# Patient Record
Sex: FEMALE | Race: WHITE | NOT HISPANIC OR LATINO | Employment: FULL TIME | URBAN - METROPOLITAN AREA
[De-identification: names, ages, dates, MRNs, and addresses within clinical notes are randomized per-mention and may not be internally consistent; named-entity substitution may affect disease eponyms.]

---

## 2021-03-23 ENCOUNTER — HOSPITAL ENCOUNTER (OUTPATIENT)
Dept: PREADMISSION TESTING | Facility: HOSPITAL | Age: 26
Discharge: HOME | End: 2021-03-23
Attending: OBSTETRICS & GYNECOLOGY

## 2021-03-24 LAB — SARS-COV-2 RNA SPEC QL NAA+PROBE: NOT DETECTED

## 2022-01-13 ENCOUNTER — OFFICE VISIT (OUTPATIENT)
Dept: OBSTETRICS AND GYNECOLOGY | Facility: CLINIC | Age: 27
End: 2022-01-13

## 2022-01-13 VITALS
WEIGHT: 196 LBS | HEART RATE: 97 BPM | BODY MASS INDEX: 31.5 KG/M2 | SYSTOLIC BLOOD PRESSURE: 143 MMHG | HEIGHT: 66 IN | DIASTOLIC BLOOD PRESSURE: 84 MMHG

## 2022-01-13 DIAGNOSIS — Z01.419 WELL WOMAN EXAM WITH ROUTINE GYNECOLOGICAL EXAM: Primary | ICD-10-CM

## 2022-01-13 PROCEDURE — G0123 SCREEN CERV/VAG THIN LAYER: HCPCS | Performed by: OBSTETRICS & GYNECOLOGY

## 2022-01-13 PROCEDURE — 99395 PREV VISIT EST AGE 18-39: CPT | Performed by: OBSTETRICS & GYNECOLOGY

## 2022-01-13 NOTE — PROGRESS NOTES
"Well Woman Visit    CC: WWE     Myriad intake: No    Tobacco/Nicotine use:  No    HPI:   26 y.o. Contraception or HRT: Condoms    History of , 8 pounds 5 ounces    History: PMHx, Meds, Allergies, PSHx, Social Hx, and POBHx all reviewed and updated.  PCP: does manage PMHx and preventative labs      /84   Pulse 97   Ht 167.6 cm (66\")   Wt 88.9 kg (196 lb)   LMP 2022   BMI 31.64 kg/m²     Physical Exam Physical Exam  Vitals and nursing note reviewed. Exam conducted with a chaperone present.   Constitutional:       Appearance: Normal appearance.   Neck:      Thyroid: No thyroid mass or thyromegaly.   Cardiovascular:      Rate and Rhythm: Regular rhythm.   Pulmonary:      Effort: Pulmonary effort is normal.      Unlabored  Chest:      Breasts: soft   normal glandularity        Right: No mass, nipple discharge or tenderness.         Left: No mass, nipple discharge or tenderness.   Abdominal:      General: There is no distension.      Palpations: Abdomen is soft.      Tenderness: There is no guarding or rebound.   Genitourinary:     General: Normal vulva.      Labia:   No lesions     Urethra: No urethral pain or urethral lesion.      Vagina: Normal. No vaginal discharge, tenderness or prolapsed vaginal walls.      Cervix: Normal.      Uterus: Normal.  Anteverted     Adnexa: Right adnexa normal and left adnexa normal.   Musculoskeletal:      Cervical back: Neck supple. No tenderness.   Skin:     General: Skin is warm and dry.   Neurological:      Mental Status: She is alert and oriented to person, place, and time.   Psychiatric:         Mood and Affect: Mood normal.         Behavior: Behavior normal.         Thought Content: Thought content normal.       ASSESSMENT AND PLAN:  WWE    Diagnoses and all orders for this visit:    1. Well woman exam with routine gynecological exam (Primary)        Counseling:     Track menses, RTO IF <q21d, >7d long, or heavy    Preventative:   MMG after age 40  s/p " COVID vaccine    Follow Up:  No follow-ups on file.    Ino White Sr., MD  01/13/2022

## 2022-01-18 LAB
CONV .: NORMAL
CYTOLOGIST CVX/VAG CYTO: NORMAL
CYTOLOGY CVX/VAG DOC CYTO: NORMAL
CYTOLOGY CVX/VAG DOC THIN PREP: NORMAL
DX ICD CODE: NORMAL
HIV 1 & 2 AB SER-IMP: NORMAL
OTHER STN SPEC: NORMAL
STAT OF ADQ CVX/VAG CYTO-IMP: NORMAL

## 2022-02-24 ENCOUNTER — TELEPHONE (OUTPATIENT)
Dept: OBSTETRICS AND GYNECOLOGY | Facility: CLINIC | Age: 27
End: 2022-02-24

## 2023-06-01 ENCOUNTER — OFFICE VISIT (OUTPATIENT)
Dept: INTERNAL MEDICINE | Facility: CLINIC | Age: 28
End: 2023-06-01
Payer: COMMERCIAL

## 2023-06-01 VITALS
OXYGEN SATURATION: 98 % | WEIGHT: 218.4 LBS | HEART RATE: 89 BPM | HEIGHT: 66 IN | BODY MASS INDEX: 35.1 KG/M2 | TEMPERATURE: 97.6 F | DIASTOLIC BLOOD PRESSURE: 89 MMHG | SYSTOLIC BLOOD PRESSURE: 141 MMHG

## 2023-06-01 DIAGNOSIS — R53.83 OTHER FATIGUE: ICD-10-CM

## 2023-06-01 DIAGNOSIS — F33.0 MILD EPISODE OF RECURRENT MAJOR DEPRESSIVE DISORDER: ICD-10-CM

## 2023-06-01 DIAGNOSIS — Z11.59 NEED FOR HEPATITIS C SCREENING TEST: ICD-10-CM

## 2023-06-01 DIAGNOSIS — Z00.00 ENCOUNTER FOR MEDICAL EXAMINATION TO ESTABLISH CARE: ICD-10-CM

## 2023-06-01 DIAGNOSIS — Z00.00 ANNUAL PHYSICAL EXAM: Primary | ICD-10-CM

## 2023-06-01 DIAGNOSIS — Z13.29 SCREENING FOR THYROID DISORDER: ICD-10-CM

## 2023-06-01 DIAGNOSIS — Z13.220 SCREENING FOR LIPID DISORDERS: ICD-10-CM

## 2023-06-01 DIAGNOSIS — F41.1 GAD (GENERALIZED ANXIETY DISORDER): ICD-10-CM

## 2023-06-01 LAB
ALBUMIN SERPL-MCNC: 4.8 G/DL (ref 3.5–5.2)
ALBUMIN/GLOB SERPL: 1.5 G/DL
ALP SERPL-CCNC: 69 U/L (ref 39–117)
ALT SERPL W P-5'-P-CCNC: 23 U/L (ref 1–33)
ANION GAP SERPL CALCULATED.3IONS-SCNC: 12 MMOL/L (ref 5–15)
AST SERPL-CCNC: 26 U/L (ref 1–32)
BASOPHILS # BLD AUTO: 0.02 10*3/MM3 (ref 0–0.2)
BASOPHILS NFR BLD AUTO: 0.3 % (ref 0–1.5)
BILIRUB SERPL-MCNC: 0.5 MG/DL (ref 0–1.2)
BUN SERPL-MCNC: 11 MG/DL (ref 6–20)
BUN/CREAT SERPL: 15.3 (ref 7–25)
CALCIUM SPEC-SCNC: 9.9 MG/DL (ref 8.6–10.5)
CHLORIDE SERPL-SCNC: 100 MMOL/L (ref 98–107)
CHOLEST SERPL-MCNC: 209 MG/DL (ref 0–200)
CO2 SERPL-SCNC: 25 MMOL/L (ref 22–29)
CREAT SERPL-MCNC: 0.72 MG/DL (ref 0.57–1)
DEPRECATED RDW RBC AUTO: 43.7 FL (ref 37–54)
EGFRCR SERPLBLD CKD-EPI 2021: 117 ML/MIN/1.73
EOSINOPHIL # BLD AUTO: 0.06 10*3/MM3 (ref 0–0.4)
EOSINOPHIL NFR BLD AUTO: 1 % (ref 0.3–6.2)
ERYTHROCYTE [DISTWIDTH] IN BLOOD BY AUTOMATED COUNT: 13 % (ref 12.3–15.4)
GLOBULIN UR ELPH-MCNC: 3.3 GM/DL
GLUCOSE SERPL-MCNC: 93 MG/DL (ref 65–99)
HCT VFR BLD AUTO: 44.3 % (ref 34–46.6)
HDLC SERPL-MCNC: 43 MG/DL (ref 40–60)
HGB BLD-MCNC: 15.1 G/DL (ref 12–15.9)
IMM GRANULOCYTES # BLD AUTO: 0.02 10*3/MM3 (ref 0–0.05)
IMM GRANULOCYTES NFR BLD AUTO: 0.3 % (ref 0–0.5)
IRON 24H UR-MRATE: 96 MCG/DL (ref 37–145)
LDLC SERPL CALC-MCNC: 122 MG/DL (ref 0–100)
LDLC/HDLC SERPL: 2.69 {RATIO}
LYMPHOCYTES # BLD AUTO: 1.65 10*3/MM3 (ref 0.7–3.1)
LYMPHOCYTES NFR BLD AUTO: 28.1 % (ref 19.6–45.3)
MCH RBC QN AUTO: 31.4 PG (ref 26.6–33)
MCHC RBC AUTO-ENTMCNC: 34.1 G/DL (ref 31.5–35.7)
MCV RBC AUTO: 92.1 FL (ref 79–97)
MONOCYTES # BLD AUTO: 0.43 10*3/MM3 (ref 0.1–0.9)
MONOCYTES NFR BLD AUTO: 7.3 % (ref 5–12)
NEUTROPHILS NFR BLD AUTO: 3.69 10*3/MM3 (ref 1.7–7)
NEUTROPHILS NFR BLD AUTO: 63 % (ref 42.7–76)
NRBC BLD AUTO-RTO: 0 /100 WBC (ref 0–0.2)
PLATELET # BLD AUTO: 242 10*3/MM3 (ref 140–450)
PMV BLD AUTO: 12.1 FL (ref 6–12)
POTASSIUM SERPL-SCNC: 3.9 MMOL/L (ref 3.5–5.2)
PROT SERPL-MCNC: 8.1 G/DL (ref 6–8.5)
RBC # BLD AUTO: 4.81 10*6/MM3 (ref 3.77–5.28)
SODIUM SERPL-SCNC: 137 MMOL/L (ref 136–145)
TRIGL SERPL-MCNC: 252 MG/DL (ref 0–150)
TSH SERPL DL<=0.05 MIU/L-ACNC: 1.15 UIU/ML (ref 0.27–4.2)
VLDLC SERPL-MCNC: 44 MG/DL (ref 5–40)
WBC NRBC COR # BLD: 5.87 10*3/MM3 (ref 3.4–10.8)

## 2023-06-01 PROCEDURE — 86803 HEPATITIS C AB TEST: CPT | Performed by: NURSE PRACTITIONER

## 2023-06-01 PROCEDURE — 80061 LIPID PANEL: CPT | Performed by: NURSE PRACTITIONER

## 2023-06-01 PROCEDURE — 83540 ASSAY OF IRON: CPT | Performed by: NURSE PRACTITIONER

## 2023-06-01 PROCEDURE — 80050 GENERAL HEALTH PANEL: CPT | Performed by: NURSE PRACTITIONER

## 2023-06-01 PROCEDURE — 82652 VIT D 1 25-DIHYDROXY: CPT | Performed by: NURSE PRACTITIONER

## 2023-06-01 PROCEDURE — 82607 VITAMIN B-12: CPT | Performed by: NURSE PRACTITIONER

## 2023-06-01 NOTE — PROGRESS NOTES
Chief Complaint  Establish Care (Patient states father has hx of stroke, wants to have labs checked.)    Subjective          Jaimie Dean presents to [facility]  Anxiety  Presents for initial visit. Symptoms include decreased concentration, depressed mood, excessive worry, irritability and nervous/anxious behavior. Patient reports no chest pain, compulsions, confusion, dizziness, dry mouth, feeling of choking, hyperventilation, impotence, insomnia, malaise, muscle tension, nausea, obsessions, palpitations, panic, restlessness, shortness of breath or suicidal ideas.     Her past medical history is significant for depression.   Depression  Visit Type: initial  Patient presents with the following symptoms: decreased concentration, depressed mood, excessive worry, irritability and nervousness/anxiety.  Patient is not experiencing: anhedonia, chest pain, choking sensation, compulsions, confusion, dizziness, dry mouth, fatigue, feelings of hopelessness, feelings of worthlessness, hypersomnia, hyperventilation, impotence, insomnia, malaise, memory impairment, muscle tension, nausea, obsessions, palpitations, panic, psychomotor agitation, psychomotor retardation, restlessness, shortness of breath, suicidal ideas, suicidal planning, thoughts of death, weight gain and weight loss.        Previous PCP: Torrie GUNTER?   Specialist(s): none  COVID vaccine: yes; 2 initial doses; 2021  Pneumonia vaccine: no  Pap Smear: Feb 2022      Father had several strokes.   Trying to conceive     hasnt felt like the old her since she had her son   Tired all the time     PHQ-2 Depression Screening  Little interest or pleasure in doing things? 1-->several days   Feeling down, depressed, or hopeless? 1-->several days   PHQ-2 Total Score 13       Current Outpatient Medications   Medication Instructions    sertraline (Zoloft) 50 MG tablet Take 1/2 tab daily x 5 days then increase to 1 tab  "daily       The following portions of the patient's history were reviewed and updated as appropriate: allergies, current medications, past family history, past medical history, past social history, past surgical history, and problem list.    Objective   Vital Signs:   /89 (BP Location: Right arm, Patient Position: Sitting)   Pulse 89   Temp 97.6 °F (36.4 °C) (Temporal)   Ht 167.6 cm (66\")   Wt 99.1 kg (218 lb 6.4 oz)   SpO2 98%   BMI 35.25 kg/m²     Wt Readings from Last 3 Encounters:   06/01/23 99.1 kg (218 lb 6.4 oz)   01/13/22 88.9 kg (196 lb)     BP Readings from Last 3 Encounters:   06/01/23 141/89   01/13/22 143/84     Physical Exam  Constitutional:       Appearance: She is obese.      Appearance: No acute distress, well-nourished  Head: normocephalic, atraumatic  Eyes: extraocular movements intact, no scleral icterus, no conjunctival injection  Ears, Nose, and Throat: external ears normal, nares patent, moist mucous membranes  Cardiovascular: regular rate and rhythm. no murmurs, rubs, or gallops. no edema  Respiratory: breathing comfortably, symmetric chest rise, clear to auscultation bilaterally. No wheezes, rales, or rhonchi.  Neuro: alert and oriented to time, place, and person. Normal gait  Psych: normal mood and affect     Result Review :   The following data was reviewed by: MICHAEL Church on 06/01/2023:  Common labs          6/1/2023    15:14   Common Labs   Glucose 93    BUN 11    Creatinine 0.72    Sodium 137    Potassium 3.9    Chloride 100    Calcium 9.9    Albumin 4.8    Total Bilirubin 0.5    Alkaline Phosphatase 69    AST (SGOT) 26    ALT (SGPT) 23    WBC 5.87    Hemoglobin 15.1    Hematocrit 44.3    Platelets 242    Total Cholesterol 209    Triglycerides 252    HDL Cholesterol 43    LDL Cholesterol  122             Lab Results   Component Value Date    COVID19 NOT DETECTED 03/23/2021       Procedures        Assessment and Plan    Diagnoses and all orders for this " visit:    1. Annual physical exam (Primary)  -     CBC & Differential  -     Comprehensive Metabolic Panel    2. Screening for thyroid disorder  -     TSH Rfx On Abnormal To Free T4    3. Screening for lipid disorders  -     Lipid Panel    4. Need for hepatitis C screening test  -     HCV Antibody Rfx To Qnt PCR  -     Interpretation:    5. Encounter for medical examination to establish care    6. Other fatigue  -     Iron  -     Vitamin B12  -     Vitamin D 1,25 dihydroxy  -     EBV Antibody Profile    7. DIANA (generalized anxiety disorder)  Assessment & Plan:  Psychological condition is newly identified.  Regular aerobic exercise.  Medication changes per orders.  Psychological condition  will be reassessed in 4 weeks.    Orders:  -     sertraline (Zoloft) 50 MG tablet; Take 1/2 tab daily x 5 days then increase to 1 tab daily  Dispense: 30 tablet; Refill: 0    8. Mild episode of recurrent major depressive disorder  Assessment & Plan:  Patient's depression is single episode and is mild without psychosis. Their depression is currently active and the condition is newly identified. This will be reassessed in 4 weeks. F/U as described:patient was prescribed an antidepressant medicine.    Orders:  -     sertraline (Zoloft) 50 MG tablet; Take 1/2 tab daily x 5 days then increase to 1 tab daily  Dispense: 30 tablet; Refill: 0      Advised on diet, physical activity, sunscreen, helmet, texting and driving, etc      There are no discontinued medications.       Follow Up   No follow-ups on file.  Patient was given instructions and counseling regarding her condition or for health maintenance advice. Please see specific information pulled into the AVS if appropriate.       Soniya Ludwig, MICHAEL  06/06/23  12:49 EDT

## 2023-06-02 LAB — VIT B12 BLD-MCNC: 516 PG/ML (ref 211–946)

## 2023-06-03 LAB
HCV AB SERPL QL IA: NORMAL
HCV IGG SERPL QL IA: NON REACTIVE

## 2023-06-05 LAB
1,25(OH)2D SERPL-MCNC: 72.5 PG/ML (ref 24.8–81.5)
EBV NA IGG SER IA-ACNC: 303 U/ML (ref 0–17.9)
EBV VCA IGG SER IA-ACNC: >600 U/ML (ref 0–17.9)
EBV VCA IGM SER IA-ACNC: <36 U/ML (ref 0–35.9)
SERVICE CMNT-IMP: ABNORMAL

## 2023-06-06 PROBLEM — F33.0 MILD EPISODE OF RECURRENT MAJOR DEPRESSIVE DISORDER: Status: ACTIVE | Noted: 2023-06-06

## 2023-06-06 PROBLEM — F41.1 GAD (GENERALIZED ANXIETY DISORDER): Status: ACTIVE | Noted: 2023-06-06

## 2023-06-06 NOTE — ASSESSMENT & PLAN NOTE
Patient's depression is single episode and is mild without psychosis. Their depression is currently active and the condition is newly identified. This will be reassessed in 4 weeks. F/U as described:patient was prescribed an antidepressant medicine.   DISPLAY PLAN FREE TEXT

## 2023-07-05 PROBLEM — E66.01 CLASS 2 SEVERE OBESITY WITH SERIOUS COMORBIDITY AND BODY MASS INDEX (BMI) OF 35.0 TO 35.9 IN ADULT: Status: ACTIVE | Noted: 2023-07-05

## 2023-08-03 DIAGNOSIS — O20.0 THREATENED ABORTION: Primary | ICD-10-CM

## 2023-08-04 ENCOUNTER — LAB (OUTPATIENT)
Dept: OBSTETRICS AND GYNECOLOGY | Facility: CLINIC | Age: 28
End: 2023-08-04
Payer: COMMERCIAL

## 2023-08-04 DIAGNOSIS — O20.0 THREATENED ABORTION: ICD-10-CM

## 2023-08-04 LAB — HCG INTACT+B SERPL-ACNC: NORMAL MIU/ML

## 2023-08-04 PROCEDURE — 84702 CHORIONIC GONADOTROPIN TEST: CPT | Performed by: OBSTETRICS & GYNECOLOGY

## 2023-08-07 ENCOUNTER — HOSPITAL ENCOUNTER (EMERGENCY)
Facility: HOSPITAL | Age: 28
Discharge: HOME | End: 2023-08-08
Attending: EMERGENCY MEDICINE | Admitting: EMERGENCY MEDICINE
Payer: COMMERCIAL

## 2023-08-07 ENCOUNTER — OFFICE VISIT (OUTPATIENT)
Dept: OBSTETRICS AND GYNECOLOGY | Facility: CLINIC | Age: 28
End: 2023-08-07
Payer: COMMERCIAL

## 2023-08-07 VITALS
BODY MASS INDEX: 34.13 KG/M2 | HEIGHT: 66 IN | WEIGHT: 212.4 LBS | SYSTOLIC BLOOD PRESSURE: 133 MMHG | HEART RATE: 83 BPM | DIASTOLIC BLOOD PRESSURE: 84 MMHG

## 2023-08-07 VITALS
TEMPERATURE: 98.2 F | SYSTOLIC BLOOD PRESSURE: 140 MMHG | DIASTOLIC BLOOD PRESSURE: 90 MMHG | HEIGHT: 66 IN | BODY MASS INDEX: 33.41 KG/M2 | HEART RATE: 87 BPM | WEIGHT: 207.89 LBS | OXYGEN SATURATION: 99 % | RESPIRATION RATE: 18 BRPM

## 2023-08-07 DIAGNOSIS — O03.4 INCOMPLETE MISCARRIAGE: Primary | ICD-10-CM

## 2023-08-07 DIAGNOSIS — O20.0 THREATENED ABORTION: Primary | ICD-10-CM

## 2023-08-07 LAB
ABO GROUP BLD: NORMAL
BASOPHILS # BLD AUTO: 0.01 10*3/MM3 (ref 0–0.2)
BASOPHILS NFR BLD AUTO: 0.1 % (ref 0–1.5)
DEPRECATED RDW RBC AUTO: 41.2 FL (ref 37–54)
EOSINOPHIL # BLD AUTO: 0.08 10*3/MM3 (ref 0–0.4)
EOSINOPHIL NFR BLD AUTO: 1 % (ref 0.3–6.2)
ERYTHROCYTE [DISTWIDTH] IN BLOOD BY AUTOMATED COUNT: 12.7 % (ref 12.3–15.4)
HCG INTACT+B SERPL-ACNC: NORMAL MIU/ML
HCG INTACT+B SERPL-ACNC: NORMAL MIU/ML
HCT VFR BLD AUTO: 38.4 % (ref 34–46.6)
HGB BLD-MCNC: 13.9 G/DL (ref 12–15.9)
IMM GRANULOCYTES # BLD AUTO: 0.02 10*3/MM3 (ref 0–0.05)
IMM GRANULOCYTES NFR BLD AUTO: 0.2 % (ref 0–0.5)
LYMPHOCYTES # BLD AUTO: 1.83 10*3/MM3 (ref 0.7–3.1)
LYMPHOCYTES NFR BLD AUTO: 22.5 % (ref 19.6–45.3)
MCH RBC QN AUTO: 32.6 PG (ref 26.6–33)
MCHC RBC AUTO-ENTMCNC: 36.2 G/DL (ref 31.5–35.7)
MCV RBC AUTO: 89.9 FL (ref 79–97)
MONOCYTES # BLD AUTO: 0.67 10*3/MM3 (ref 0.1–0.9)
MONOCYTES NFR BLD AUTO: 8.3 % (ref 5–12)
NEUTROPHILS NFR BLD AUTO: 5.51 10*3/MM3 (ref 1.7–7)
NEUTROPHILS NFR BLD AUTO: 67.9 % (ref 42.7–76)
NRBC BLD AUTO-RTO: 0 /100 WBC (ref 0–0.2)
PLATELET # BLD AUTO: 251 10*3/MM3 (ref 140–450)
PMV BLD AUTO: 10.9 FL (ref 6–12)
RBC # BLD AUTO: 4.27 10*6/MM3 (ref 3.77–5.28)
RH BLD: POSITIVE
WBC NRBC COR # BLD: 8.12 10*3/MM3 (ref 3.4–10.8)

## 2023-08-07 PROCEDURE — 85025 COMPLETE CBC W/AUTO DIFF WBC: CPT | Performed by: NURSE PRACTITIONER

## 2023-08-07 PROCEDURE — 99283 EMERGENCY DEPT VISIT LOW MDM: CPT

## 2023-08-07 PROCEDURE — 86900 BLOOD TYPING SEROLOGIC ABO: CPT | Performed by: EMERGENCY MEDICINE

## 2023-08-07 PROCEDURE — 84702 CHORIONIC GONADOTROPIN TEST: CPT | Performed by: NURSE PRACTITIONER

## 2023-08-07 PROCEDURE — 86901 BLOOD TYPING SEROLOGIC RH(D): CPT | Performed by: EMERGENCY MEDICINE

## 2023-08-07 PROCEDURE — 36415 COLL VENOUS BLD VENIPUNCTURE: CPT | Performed by: NURSE PRACTITIONER

## 2023-08-07 NOTE — ED PROVIDER NOTES
Time: 6:53 PM EDT  Date of encounter:  2023  Independent Historian/Clinical History and Information was obtained by:   Patient    History is limited by: N/A    Chief Complaint: Vaginal bleeding      History of Present Illness:  Patient is a 28 y.o. year old female who presents to the emergency department for evaluation of a possible miscarriage.  The patient notes that the first day of her last menstrual period was May 30.  It was confirmed by home pregnancy test.  The patient is  2 para 1.  The patient started having brown vaginal discharge over the last 3 weeks.  However, the patient started having vaginal bleeding yesterday.  It has increased in severity.  The patient states that she is bleeding more than a period ends at times passing clots.  The patient notes some suprapubic cramping.  Patient had an outpatient ultrasound that demonstrated a gestation at 6 weeks which was not congruent with her dates.  There was only a fetal pole and yolk sac present there is no fetal heart tones present.  The patient denies any shortness of breath, chest pain, unusual fatigue, near passing out or passing out      HPI    Patient Care Team  Primary Care Provider: Soniya Ludwig APRN    Past Medical History:     Allergies   Allergen Reactions    Amoxicillin Rash     Past Medical History:   Diagnosis Date    Abnormal Pap smear of cervix     Anxiety     Depression      History reviewed. No pertinent surgical history.  Family History   Problem Relation Age of Onset    Lung cancer Paternal Grandfather     Uterine cancer Maternal Grandmother        Home Medications:  Prior to Admission medications    Medication Sig Start Date End Date Taking? Authorizing Provider   sertraline (Zoloft) 50 MG tablet Take 1 tablet by mouth Daily. Take 1/2 tab daily x 5 days then increase to 1 tab daily  Patient not taking: Reported on 2023   Soniya Ludwig APRN        Social History:   Social History     Tobacco Use  "   Smoking status: Never     Passive exposure: Never    Smokeless tobacco: Never   Vaping Use    Vaping Use: Never used   Substance Use Topics    Alcohol use: Not Currently     Comment: occ    Drug use: Never         Review of Systems:  Review of Systems   Constitutional:  Negative for chills, diaphoresis and fever.   HENT:  Negative for congestion, postnasal drip, rhinorrhea and sore throat.    Eyes:  Negative for photophobia.   Respiratory:  Negative for cough, chest tightness and shortness of breath.    Cardiovascular:  Negative for chest pain, palpitations and leg swelling.   Gastrointestinal:  Negative for abdominal pain, diarrhea, nausea and vomiting.   Genitourinary:  Positive for pelvic pain and vaginal bleeding. Negative for difficulty urinating, dysuria, flank pain, frequency, hematuria and urgency.   Musculoskeletal:  Negative for neck pain and neck stiffness.   Skin:  Negative for pallor and rash.   Neurological:  Negative for dizziness, syncope, weakness, numbness and headaches.   Hematological:  Negative for adenopathy. Does not bruise/bleed easily.   Psychiatric/Behavioral: Negative.        Physical Exam:  /90 (BP Location: Right arm, Patient Position: Lying)   Pulse 87   Temp 98.2 øF (36.8 øC) (Oral)   Resp 18   Ht 167.6 cm (66\")   Wt 94.3 kg (207 lb 14.3 oz)   LMP 05/30/2023   SpO2 99%   Breastfeeding No   BMI 33.55 kg/mý     Physical Exam  Vitals and nursing note reviewed.   Constitutional:       General: She is not in acute distress.     Appearance: Normal appearance. She is not ill-appearing, toxic-appearing or diaphoretic.   HENT:      Head: Normocephalic and atraumatic.      Mouth/Throat:      Mouth: Mucous membranes are moist.   Eyes:      Pupils: Pupils are equal, round, and reactive to light.   Cardiovascular:      Rate and Rhythm: Normal rate and regular rhythm.      Pulses: Normal pulses.           Carotid pulses are 2+ on the right side and 2+ on the left side.       Radial " pulses are 2+ on the right side and 2+ on the left side.        Femoral pulses are 2+ on the right side and 2+ on the left side.       Popliteal pulses are 2+ on the right side and 2+ on the left side.        Dorsalis pedis pulses are 2+ on the right side and 2+ on the left side.        Posterior tibial pulses are 2+ on the right side and 2+ on the left side.      Heart sounds: Normal heart sounds. No murmur heard.  Pulmonary:      Effort: Pulmonary effort is normal. No accessory muscle usage, respiratory distress or retractions.      Breath sounds: Normal breath sounds. No wheezing, rhonchi or rales.   Abdominal:      General: Abdomen is flat. There is no distension.      Palpations: Abdomen is soft. There is no mass or pulsatile mass.      Tenderness: There is abdominal tenderness in the suprapubic area. There is no right CVA tenderness, left CVA tenderness, guarding or rebound.      Comments: No rigidity   Genitourinary:     Vagina: No signs of injury.      Cervix: Cervical bleeding present. No erythema.      Comments: The cervical os is closed    There is minimal to moderate dark blood with in the vagina, no active bleeding from the os   Musculoskeletal:         General: No swelling, tenderness or deformity.      Cervical back: Neck supple. No tenderness.      Right lower leg: No edema.      Left lower leg: No edema.   Skin:     General: Skin is warm and dry.      Capillary Refill: Capillary refill takes less than 2 seconds.      Coloration: Skin is not jaundiced or pale.      Findings: No erythema.   Neurological:      General: No focal deficit present.      Mental Status: She is alert and oriented to person, place, and time. Mental status is at baseline.      Cranial Nerves: Cranial nerves 2-12 are intact. No cranial nerve deficit.      Sensory: Sensation is intact. No sensory deficit.      Motor: Motor function is intact. No weakness or pronator drift.      Coordination: Coordination is intact. Coordination  normal.   Psychiatric:         Mood and Affect: Mood normal.         Behavior: Behavior normal.                Procedures:  Procedures      Medical Decision Making:      Comorbidities that affect care:    None    External Notes reviewed:    None      The following orders were placed and all results were independently analyzed by me:  Orders Placed This Encounter   Procedures    hCG, Quantitative, Pregnancy    CBC Auto Differential    ABO / Rh    CBC & Differential       Medications Given in the Emergency Department:  Medications - No data to display     ED Course:         Labs:    Lab Results (last 24 hours)       Procedure Component Value Units Date/Time    hCG, Quantitative, Pregnancy [[id number]] Collected: 08/07/23 1257    Specimen: Blood from Arm, Left Updated: 08/07/23 2326     HCG Quantitative 13,192.00 mIU/mL     Narrative:      HCG Ranges by Gestational Age    Females - non-pregnant premenopausal   </= 1mIU/mL HCG  Females - postmenopausal               </= 7mIU/mL HCG    3 Weeks       5.4   -      72 mIU/mL  4 Weeks      10.2   -     708 mIU/mL  5 Weeks       217   -   8,245 mIU/mL  6 Weeks       152   -  32,177 mIU/mL  7 Weeks     4,059   - 153,767 mIU/mL  8 Weeks    31,366   - 149,094 mIU/mL  9 Weeks    59,109   - 135,901 mIU/mL  10 Weeks   44,186   - 170,409 mIU/mL  12 Weeks   27,107   - 201,615 mIU/mL  14 Weeks   24,302   -  93,646 mIU/mL  15 Weeks   12,540   -  69,747 mIU/mL  16 Weeks    8,904   -  55,332 mIU/mL  17 Weeks    8,240   -  51,793 mIU/mL  18 Weeks    9,649   -  55,271 mIU/mL    Results may be falsely decreased if patient taking Biotin.      CBC & Differential [[id number]]  (Abnormal) Collected: 08/07/23 1903    Specimen: Blood from Arm, Left Updated: 08/07/23 1914    Narrative:      The following orders were created for panel order CBC & Differential.  Procedure                               Abnormality         Status                     ---------                               -----------          ------                     CBC Auto Differential[[id number]]        Abnormal            Final result                 Please view results for these tests on the individual orders.    hCG, Quantitative, Pregnancy [[id number]] Collected: 08/07/23 1903    Specimen: Blood from Arm, Left Updated: 08/07/23 1948     HCG Quantitative 12,113.00 mIU/mL     Narrative:      HCG Ranges by Gestational Age    Females - non-pregnant premenopausal   </= 1mIU/mL HCG  Females - postmenopausal               </= 7mIU/mL HCG    3 Weeks       5.4   -      72 mIU/mL  4 Weeks      10.2   -     708 mIU/mL  5 Weeks       217   -   8,245 mIU/mL  6 Weeks       152   -  32,177 mIU/mL  7 Weeks     4,059   - 153,767 mIU/mL  8 Weeks    31,366   - 149,094 mIU/mL  9 Weeks    59,109   - 135,901 mIU/mL  10 Weeks   44,186   - 170,409 mIU/mL  12 Weeks   27,107   - 201,615 mIU/mL  14 Weeks   24,302   -  93,646 mIU/mL  15 Weeks   12,540   -  69,747 mIU/mL  16 Weeks    8,904   -  55,332 mIU/mL  17 Weeks    8,240   -  51,793 mIU/mL  18 Weeks    9,649   -  55,271 mIU/mL      CBC Auto Differential [[id number]]  (Abnormal) Collected: 08/07/23 1903    Specimen: Blood from Arm, Left Updated: 08/07/23 1914     WBC 8.12 10*3/mm3      RBC 4.27 10*6/mm3      Hemoglobin 13.9 g/dL      Hematocrit 38.4 %      MCV 89.9 fL      MCH 32.6 pg      MCHC 36.2 g/dL      RDW 12.7 %      RDW-SD 41.2 fl      MPV 10.9 fL      Platelets 251 10*3/mm3      Neutrophil % 67.9 %      Lymphocyte % 22.5 %      Monocyte % 8.3 %      Eosinophil % 1.0 %      Basophil % 0.1 %      Immature Grans % 0.2 %      Neutrophils, Absolute 5.51 10*3/mm3      Lymphocytes, Absolute 1.83 10*3/mm3      Monocytes, Absolute 0.67 10*3/mm3      Eosinophils, Absolute 0.08 10*3/mm3      Basophils, Absolute 0.01 10*3/mm3      Immature Grans, Absolute 0.02 10*3/mm3      nRBC 0.0 /100 WBC              Imaging:    US Ob < 14 Weeks Single or First Gestation    Result Date: 8/7/2023  [facility]  [facility]: 23 Patient:  Jaimie Dean    MR#:[id number] 28 y.o.   Indication: Viability, vaginal bleeding Comparison: none Method: TVUS Findings: Crown-rump length 0.38 cm, 6 weeks and 0 days Average ultrasound age 6 weeks and 0 days with TERESA 2024.  This is discordant by LMP: TERESA 3/5/2024 9 weeks and 6 days No fetal heart tones seen today Impression: Crown-rump length 6 weeks 0 days, no FHR See scanned in copy for complete details Electronically signed by Melva Ryan DO, 23, 1:03 PM EDT. [facility] [address] [place] [address] Dept: [phone] Dept Fax: [phone] Loc: [phone] Loc Fax: [phone]        Differential Diagnosis and Discussion:    Vaginal Bleeding: Differential diagnosis includes but is not limited to foreign body, tumor, vaginitis, dysfunctional uterine bleeding, endocrine abnormalities, coagulation disorder, systemic illness, polyps, complications of pregnancy (possible ectopic pregnancy).    All labs were reviewed and interpreted by me.    MDM  Number of Diagnoses or Management Options  Incomplete miscarriage  Diagnosis management comments: The patient's CBC was reviewed and shows no abnormalities of critical concern.  Of note, there is no anemia requiring a blood transfusion and the platelet count is acceptable    Patient's Rh type is positive    Patient states she is G quant was 12,000.  The patient's hCG quant was 14,004 days ago.  Indicating an unsuccessful pregnancy    I reviewed the patient's ultrasound from previously today.  The patient's ultrasound was not consistent with the patient's dates.  Indicating a incomplete miscarriage.  This is also congruent with the patient's hCG quant level.    Pelvic exam, the patient's hospital is closed and there is only trace old blood within the vaginal vault.    Patient will be placed on Motrin and  hydrocodone    Patient will follow up with their gynecologist in 48 hours    The patient was given very specific instructions on when and why to return to the emergency room.  The patient voiced understanding and felt comfortable with the discharge instructions.  They would return to the emergency room if necessary.  The patient appears appropriate for discharge and outpatient follow-up.         Amount and/or Complexity of Data Reviewed  Clinical lab tests: reviewed  Decide to obtain previous medical records or to obtain history from someone other than the patient: yes  Review and summarize past medical records: yes           Social Determinants of Health:    Patient is independent, reliable, and has access to care.       Disposition and Care Coordination:    Discharged: The patient is suitable and stable for discharge with no need for consideration of observation or admission.    I have explained discharge medications and the need for follow up with the patient/caretakers. This was also printed in the discharge instructions. Patient was discharged with the following medications and follow up:      Medication List        New Prescriptions      HYDROcodone-acetaminophen 5-325 MG per tablet  Commonly known as: NORCO  Take 1 tablet by mouth Every 4 (Four) Hours As Needed for Moderate Pain for up to 3 days.     ibuprofen 600 MG tablet  Commonly known as: ADVIL,MOTRIN  Take 1 tablet by mouth Every 8 (Eight) Hours As Needed for Mild Pain for up to 7 days.               Where to Get Your Medications        These medications were sent to [id number] - [place], [place] - [address]  AT [facility] [address] - [phone]  - [phone] FX  [address] [facility] [place] [facility] [place] [address]      Phone: [phone]   HYDROcodone-acetaminophen 5-325 MG per tablet  ibuprofen 600 MG tablet      Joseluis Pond MD  [facility] [place] [id number] [phone]    On 8/10/2023  incomplete  misscarriage, call for appointment       Final diagnoses:   Incomplete miscarriage        ED Disposition       ED Disposition   Discharge    Condition   Stable    Comment   --               This medical record created using voice recognition software.             Leonardo Donaldson DO  08/08/23 0312

## 2023-08-07 NOTE — PROGRESS NOTES
"Chief Complaint   Patient presents with    Follow-up     Go over ultrasound results           HPI  Jaimie Dean is a 28 y.o. female, , who presents for follow up on a Threatened AB.     States LMP 23, first positive test end of .  States had light brown spotting last week every few days or so.  Red spotting on Friday.  Heavier spotting today with a few clots.     US shows fetal pole measuring 6 weeks, with a yolk sac identified.  No FHTs seen today.     Beta on 23 was [id number].  Patient is very concerned for miscarriage.     Recent Tests:  Rh Status: Positive      The additional following portions of the patient's history were reviewed and updated as appropriate: allergies, current medications, past family history, past medical history, past social history, past surgical history, and problem list.    Review of Systems   Constitutional: Negative.    Genitourinary:         Bleeding in early pregnancy       I have reviewed and agree with the HPI, ROS, and historical information as entered above. Gerald Henry, APRN    Objective   /84   Pulse 83   Ht 167.6 cm (65.98\")   Wt 96.3 kg (212 lb 6.4 oz)   LMP 2023   Breastfeeding No   BMI 34.30 kg/mý     Physical Exam  Vitals and nursing note reviewed.   Constitutional:       Appearance: Normal appearance. She is well-developed and well-groomed.      Comments: Patient is tearful     Neurological:      Mental Status: She is alert.   Psychiatric:         Attention and Perception: Attention and perception normal.         Mood and Affect: Affect normal.         Speech: Speech normal.         Behavior: Behavior is cooperative.         Cognition and Memory: Cognition normal.          Assessment and Plan    Problem List Items Addressed This Visit          Gravid and     Threatened  - Primary    Overview     US from 23 shows IUP with yolk sac and fetal pole which measures 6 weeks, no FHTs seen.  Bleeding precautions " reviewed, will plan follow up ultrasound in one week.  Pelvic rest.         Current Assessment & Plan     Repeat beta HCG today, will plan follow up ultrasound in one week.         Relevant Orders    hCG, Quantitative, Pregnancy    US Ob Transvaginal       Threatened AB  Repeat U/S in 1 week(s).  Pelvic Rest.  No douching, intercourse or use of tampons.  Call for an increase in bleeding, abdominal pain, or fever.  Report to ED if saturating a pad greater than every 30-60 mins.  Follow Up: Return in about 1 week (around 8/14/2023).  Return in about 1 week (around 8/14/2023).        Gerald Henry, APRN  08/07/2023

## 2023-08-08 ENCOUNTER — TELEPHONE (OUTPATIENT)
Dept: OBSTETRICS AND GYNECOLOGY | Facility: CLINIC | Age: 28
End: 2023-08-08
Payer: COMMERCIAL

## 2023-08-08 DIAGNOSIS — O20.0 THREATENED ABORTION: Primary | ICD-10-CM

## 2023-08-08 RX ORDER — IBUPROFEN 600 MG/1
600 TABLET ORAL EVERY 8 HOURS PRN
Qty: 21 TABLET | Refills: 0 | Status: SHIPPED | OUTPATIENT
Start: 2023-08-08 | End: 2023-08-15

## 2023-08-08 RX ORDER — HYDROCODONE BITARTRATE AND ACETAMINOPHEN 5; 325 MG/1; MG/1
1 TABLET ORAL EVERY 4 HOURS PRN
Qty: 18 TABLET | Refills: 0 | Status: SHIPPED | OUTPATIENT
Start: 2023-08-08 | End: 2023-08-11

## 2023-08-08 NOTE — PROGRESS NOTES
Discussed with patient.  Patient desires to discuss probable expected management.  Will repeat BHCG tomorrow morning.  Desires appointment tomorrow to discuss which is scheduled.  Declines appointment with Physician to discuss possible D&C at this time.  Please place STAT BHCG order.

## 2023-08-08 NOTE — TELEPHONE ENCOUNTER
Caller: Jaimie Dean    Relationship to patient: Self    Best call back number: [phone] CAN CALL BACK AND LVM    Chief complaint: INCOMPLETE MISCARRIAGE     Type of visit: ER FOLLOW UP    Requested date: PER ER NOTE ON 0810-2023        UNABLE TO WT CALL

## 2023-08-08 NOTE — DISCHARGE INSTRUCTIONS
Please return to the emergency room for uncontrolled/ severe bleeding, uncontrolled pain, shortness of breath, near passing out, unusual fatigue, or any new symptoms you are concerned with

## 2023-08-09 ENCOUNTER — LAB (OUTPATIENT)
Dept: LAB | Facility: HOSPITAL | Age: 28
End: 2023-08-09
Payer: COMMERCIAL

## 2023-08-09 ENCOUNTER — OFFICE VISIT (OUTPATIENT)
Dept: OBSTETRICS AND GYNECOLOGY | Facility: CLINIC | Age: 28
End: 2023-08-09
Payer: COMMERCIAL

## 2023-08-09 VITALS
HEART RATE: 92 BPM | HEIGHT: 66 IN | WEIGHT: 209 LBS | BODY MASS INDEX: 33.59 KG/M2 | DIASTOLIC BLOOD PRESSURE: 97 MMHG | SYSTOLIC BLOOD PRESSURE: 136 MMHG

## 2023-08-09 DIAGNOSIS — O03.9 SAB (SPONTANEOUS ABORTION): Primary | ICD-10-CM

## 2023-08-09 DIAGNOSIS — F41.1 GAD (GENERALIZED ANXIETY DISORDER): ICD-10-CM

## 2023-08-09 DIAGNOSIS — O20.0 THREATENED ABORTION: ICD-10-CM

## 2023-08-09 DIAGNOSIS — F33.0 MILD EPISODE OF RECURRENT MAJOR DEPRESSIVE DISORDER: ICD-10-CM

## 2023-08-09 LAB — HCG INTACT+B SERPL-ACNC: 3035 MIU/ML

## 2023-08-09 PROCEDURE — 36415 COLL VENOUS BLD VENIPUNCTURE: CPT

## 2023-08-09 PROCEDURE — 84702 CHORIONIC GONADOTROPIN TEST: CPT

## 2023-08-09 NOTE — PROGRESS NOTES
"Chief Complaint   Patient presents with    Follow-up     Follow up from ER repeat BHCG was completed at [facility] this morning           LUZ Dean is a 28 y.o. female, , who presents for follow up on a  SAB .   She was evaluated in the ED on 23, passed large clots while there.  Chose expectant management.  Her bleeding today is flow about like a period. She complains of cramping pain.  The pain is located in her pelvis.. Her past medical history is non-contributory. She reports no additional symptoms or complaints.    Recent Tests:  US: No.  Rh Status: Positive      The additional following portions of the patient's history were reviewed and updated as appropriate: allergies, current medications, past family history, past medical history, past social history, past surgical history, and problem list.    Review of Systems   Constitutional: Negative.    Genitourinary:  Positive for vaginal bleeding.       I have reviewed and agree with the HPI, ROS, and historical information as entered above. Gerald Henry, APRN    Objective   /97   Pulse 92   Ht 167.6 cm (65.98\")   Wt 94.8 kg (209 lb)   LMP 2023   BMI 33.75 kg/mý     Physical Exam  Vitals and nursing note reviewed.   Constitutional:       Appearance: Normal appearance. She is well-developed and well-groomed.   Neurological:      Mental Status: She is alert.   Psychiatric:         Attention and Perception: Attention and perception normal.         Mood and Affect: Affect normal.         Speech: Speech normal.         Behavior: Behavior is cooperative.         Cognition and Memory: Cognition normal.          Assessment and Plan    Problem List Items Addressed This Visit    None  Visit Diagnoses       SAB (spontaneous )    -  Primary    Relevant Orders    hCG, Quantitative, Pregnancy            SAB  Pelvic Rest.  No douching, intercourse or use of tampons.  Call for an increase in bleeding, abdominal pain, or fever.  Follow " Up: Return for as needed.  Discussed need to follow beta HCG until it drops to zero, verbalizes understanding, next level in five days.  Off work until 8/14/23  Return for as needed.        Gerald Henry, APRN  08/09/2023

## 2023-08-11 ENCOUNTER — TELEMEDICINE (OUTPATIENT)
Dept: INTERNAL MEDICINE | Facility: CLINIC | Age: 28
End: 2023-08-11
Payer: COMMERCIAL

## 2023-08-11 DIAGNOSIS — F33.0 MILD EPISODE OF RECURRENT MAJOR DEPRESSIVE DISORDER: ICD-10-CM

## 2023-08-11 DIAGNOSIS — F41.1 GAD (GENERALIZED ANXIETY DISORDER): ICD-10-CM

## 2023-08-11 PROBLEM — O20.0 THREATENED ABORTION: Status: RESOLVED | Noted: 2023-08-07 | Resolved: 2023-08-11

## 2023-08-11 PROBLEM — Z01.419 WELL WOMAN EXAM WITH ROUTINE GYNECOLOGICAL EXAM: Status: RESOLVED | Noted: 2022-01-13 | Resolved: 2023-08-11

## 2023-08-11 PROCEDURE — 99214 OFFICE O/P EST MOD 30 MIN: CPT | Performed by: NURSE PRACTITIONER

## 2023-08-11 NOTE — ASSESSMENT & PLAN NOTE
Patient's depression is recurrent and is mild without psychosis. Their depression is currently active and the condition is worsening. This will be reassessed in 3 months. F/U as described:patient was prescribed an antidepressant medicine.

## 2023-08-11 NOTE — ASSESSMENT & PLAN NOTE
Psychological condition is worsening.  Regular aerobic exercise.  Medication changes per orders.  Psychological condition  will be reassessed in 3 months.

## 2023-08-11 NOTE — PROGRESS NOTES
Mode of Visit: Video via [employer]  Location of patient: home  Physician's location is at clinic ([facility] clinic at [address] [place] [address] [address]).  You have chosen to receive care through a telehealth visit.  The patient has signed the video visit consent form.  The visit included audio and video interaction.      Chief Complaint  DIANA/MDD    Subjective          Jaimie Dean presents to [facility]    HPI  Anxiety  Presents for follow-up visit. Symptoms include decreased concentration, depressed mood, excessive worry, irritability and nervous/anxious behavior. Patient reports no chest pain, compulsions, confusion, dizziness, dry mouth, feeling of choking, hyperventilation, impotence, insomnia, malaise, muscle tension, nausea, obsessions, palpitations, panic, restlessness, shortness of breath or suicidal ideas.     Her past medical history is significant for depression.   Depression  Visit Type: follow-up   Patient presents with the following symptoms: decreased concentration, depressed mood, excessive worry, irritability and nervousness/anxiety.  Patient is not experiencing: anhedonia, chest pain, choking sensation, compulsions, confusion, dizziness, dry mouth, fatigue, feelings of hopelessness, feelings of worthlessness, hypersomnia, hyperventilation, impotence, insomnia, malaise, memory impairment, muscle tension, nausea, obsessions, palpitations, panic, psychomotor agitation, psychomotor retardation, restlessness, shortness of breath, suicidal ideas, suicidal planning, thoughts of death, weight gain and weight loss.      Had stopped zoloft due to miscarriage but wants to get back on it.     Current Outpatient Medications   Medication Instructions    HYDROcodone-acetaminophen (NORCO) 5-325 MG per tablet 1 tablet, Oral, Every 4 Hours PRN    ibuprofen (ADVIL,MOTRIN) 600 mg, Oral, Every 8 Hours PRN    sertraline (ZOLOFT) 50  mg, Oral, Daily, Take 1/2 tab daily x 5 days then increase to 1 tab daily       The following portions of the patient's history were reviewed and updated as appropriate: allergies, current medications, past family history, past medical history, past social history, past surgical history, and problem list.    Objective   Vital Signs:   There were no vitals taken for this visit.    Wt Readings from Last 3 Encounters:   08/09/23 94.8 kg (209 lb)   08/07/23 94.3 kg (207 lb 14.3 oz)   08/07/23 96.3 kg (212 lb 6.4 oz)     BP Readings from Last 3 Encounters:   08/09/23 136/97   08/07/23 140/90   08/07/23 133/84       Physical Exam   Virtual Visit Physical Exam  Gen: well-nourished, no acute distress  HENT: atraumatic, normocephalic  Eyes: extraocular movements intact, no scleral icterus  Lung: breathing comfortably, no cough  Neuro: grossly oriented to person, place, and time. no facial droop   Psych: normal mood and affect    Result Review :     Common labs          6/1/2023    15:14 8/7/2023    19:03   Common Labs   Glucose 93     BUN 11     Creatinine 0.72     Sodium 137     Potassium 3.9     Chloride 100     Calcium 9.9     Albumin 4.8     Total Bilirubin 0.5     Alkaline Phosphatase 69     AST (SGOT) 26     ALT (SGPT) 23     WBC 5.87  8.12    Hemoglobin 15.1  13.9    Hematocrit 44.3  38.4    Platelets 242  251    Total Cholesterol 209     Triglycerides 252     HDL Cholesterol 43     LDL Cholesterol  122                 Assessment and Plan    Diagnoses and all orders for this visit:    Diagnoses and all orders for this visit:    1. DIANA (generalized anxiety disorder)  Assessment & Plan:  Psychological condition is worsening.  Regular aerobic exercise.  Medication changes per orders.  Psychological condition  will be reassessed in 3 months.    Orders:  -     sertraline (Zoloft) 50 MG tablet; Take 1 tablet by mouth Daily. Take 1/2 tab daily x 5 days then increase to 1 tab daily  Dispense: 30 tablet; Refill: 1    2. Mild  episode of recurrent major depressive disorder  Assessment & Plan:  Patient's depression is recurrent and is mild without psychosis. Their depression is currently active and the condition is worsening. This will be reassessed in 3 months. F/U as described:patient was prescribed an antidepressant medicine.    Orders:  -     sertraline (Zoloft) 50 MG tablet; Take 1 tablet by mouth Daily. Take 1/2 tab daily x 5 days then increase to 1 tab daily  Dispense: 30 tablet; Refill: 1        Medications Discontinued During This Encounter   Medication Reason    sertraline (Zoloft) 50 MG tablet Reorder          Follow Up   Return for 3 months; call in 4-6 weeks if not improving. .  Patient was given instructions and counseling regarding his condition or for health maintenance advice. Please see specific information pulled into the AVS if appropriate.

## 2023-08-14 ENCOUNTER — LAB (OUTPATIENT)
Dept: LAB | Facility: HOSPITAL | Age: 28
End: 2023-08-14
Payer: COMMERCIAL

## 2023-08-14 DIAGNOSIS — O03.9 SAB (SPONTANEOUS ABORTION): ICD-10-CM

## 2023-08-14 LAB — HCG INTACT+B SERPL-ACNC: 369 MIU/ML

## 2023-08-14 PROCEDURE — 84702 CHORIONIC GONADOTROPIN TEST: CPT

## 2023-08-14 PROCEDURE — 36415 COLL VENOUS BLD VENIPUNCTURE: CPT

## 2023-08-15 ENCOUNTER — TELEPHONE (OUTPATIENT)
Dept: OBSTETRICS AND GYNECOLOGY | Facility: CLINIC | Age: 28
End: 2023-08-15

## 2023-08-15 DIAGNOSIS — O03.9 SAB (SPONTANEOUS ABORTION): Primary | ICD-10-CM

## 2023-08-15 NOTE — TELEPHONE ENCOUNTER
----- Message from MICHAEL Rose sent at 8/15/2023  9:16 AM EDT -----  Please let patient know her beta HCG has dropped to 369.  Recommend repeat level in one week, continue pelvic rest.  I will place order.

## 2023-08-15 NOTE — TELEPHONE ENCOUNTER
Called patient mail box is full could not leave message on patient voicemail I have sent detailed message to patient my chart.

## 2023-08-21 ENCOUNTER — LAB (OUTPATIENT)
Dept: LAB | Facility: HOSPITAL | Age: 28
End: 2023-08-21
Payer: COMMERCIAL

## 2023-08-21 DIAGNOSIS — O03.9 SAB (SPONTANEOUS ABORTION): ICD-10-CM

## 2023-08-21 LAB — HCG INTACT+B SERPL-ACNC: 35.8 MIU/ML

## 2023-08-21 PROCEDURE — 84702 CHORIONIC GONADOTROPIN TEST: CPT

## 2023-08-21 PROCEDURE — 36415 COLL VENOUS BLD VENIPUNCTURE: CPT

## 2023-08-22 ENCOUNTER — TELEPHONE (OUTPATIENT)
Dept: OBSTETRICS AND GYNECOLOGY | Facility: CLINIC | Age: 28
End: 2023-08-22
Payer: COMMERCIAL

## 2023-08-22 DIAGNOSIS — O03.9 SAB (SPONTANEOUS ABORTION): Primary | ICD-10-CM

## 2023-08-22 NOTE — TELEPHONE ENCOUNTER
Called patient aware of results and information patient is going to plan on going to [facility] on 08/28/2023 to have BHCG drawn.

## 2023-08-22 NOTE — TELEPHONE ENCOUNTER
----- Message from MICHAEL Rose sent at 8/22/2023  9:08 AM EDT -----  Please let patient know her beta HCG has dropped to 35.80, recommend repeat level in 1 to 2 weeks just to ensure level drops all the way back down. Order placed.

## 2023-08-31 ENCOUNTER — LAB (OUTPATIENT)
Dept: LAB | Facility: HOSPITAL | Age: 28
End: 2023-08-31
Payer: COMMERCIAL

## 2023-08-31 DIAGNOSIS — O03.9 SAB (SPONTANEOUS ABORTION): ICD-10-CM

## 2023-08-31 LAB — HCG INTACT+B SERPL-ACNC: 2.43 MIU/ML

## 2023-08-31 PROCEDURE — 84702 CHORIONIC GONADOTROPIN TEST: CPT

## 2023-08-31 PROCEDURE — 36415 COLL VENOUS BLD VENIPUNCTURE: CPT

## 2023-09-01 ENCOUNTER — TELEPHONE (OUTPATIENT)
Dept: OBSTETRICS AND GYNECOLOGY | Facility: CLINIC | Age: 28
End: 2023-09-01
Payer: COMMERCIAL

## 2023-09-01 DIAGNOSIS — O03.9 SAB (SPONTANEOUS ABORTION): Primary | ICD-10-CM

## 2023-09-01 NOTE — TELEPHONE ENCOUNTER
----- Message from MICHAEL Rose sent at 9/1/2023  1:42 PM EDT -----  Please let patient know her beta HCG has dropped to 2.43.  Recommend one more level in 1-2 weeks to ensure it drops back to zero unless patient has had her cycle.  Will place order.

## 2023-09-08 ENCOUNTER — OFFICE VISIT (OUTPATIENT)
Dept: INTERNAL MEDICINE | Facility: CLINIC | Age: 28
End: 2023-09-08
Payer: COMMERCIAL

## 2023-09-08 VITALS
HEIGHT: 65 IN | SYSTOLIC BLOOD PRESSURE: 131 MMHG | TEMPERATURE: 98.9 F | DIASTOLIC BLOOD PRESSURE: 85 MMHG | HEART RATE: 75 BPM | BODY MASS INDEX: 33.82 KG/M2 | WEIGHT: 203 LBS | OXYGEN SATURATION: 98 %

## 2023-09-08 DIAGNOSIS — H10.9 CONJUNCTIVITIS OF BOTH EYES, UNSPECIFIED CONJUNCTIVITIS TYPE: ICD-10-CM

## 2023-09-08 DIAGNOSIS — H92.09 OTALGIA, UNSPECIFIED LATERALITY: Primary | ICD-10-CM

## 2023-09-08 DIAGNOSIS — H57.89 EYE DRAINAGE: ICD-10-CM

## 2023-09-08 DIAGNOSIS — J02.9 SORE THROAT: ICD-10-CM

## 2023-09-08 LAB
EXPIRATION DATE: NORMAL
INTERNAL CONTROL: NORMAL
Lab: NORMAL
S PYO AG THROAT QL: NEGATIVE

## 2023-09-08 RX ORDER — OFLOXACIN 3 MG/ML
1 SOLUTION/ DROPS OPHTHALMIC 4 TIMES DAILY
Qty: 5 ML | Refills: 0 | Status: SHIPPED | OUTPATIENT
Start: 2023-09-08 | End: 2023-09-15

## 2023-09-08 NOTE — PROGRESS NOTES
"Chief Complaint  Earache, Eye Drainage (Started yesterday ), and Sore Throat (Started Sunday )    Subjective          Jaimie Dean presents to [facility]  History of Present Illness    Here with comlpaints of redness of bilateral eyes and drainage plus sore throat.  Eye symptoms started yesterday.  Sore throat started Sunday the 3rd.    Having no fever, no cough, no congestion, no vomiting, or diarrhea.    Current Outpatient Medications   Medication Instructions    ofloxacin (Ocuflox) 0.3 % ophthalmic solution 1 drop, Both Eyes, 4 Times Daily    phenol (CHLORASEPTIC) 1.4 % liquid liquid 1 spray, Mouth/Throat, Every 2 Hours PRN    sertraline (ZOLOFT) 50 mg, Oral, Daily, Take 1/2 tab daily x 5 days then increase to 1 tab daily     The following portions of the patient's history were reviewed and updated as appropriate: allergies, current medications, past family history, past medical history, past social history, past surgical history, and problem list.    Objective   Vital Signs:   /85 (BP Location: Left arm, Patient Position: Sitting, Cuff Size: Large Adult)   Pulse 75   Temp 98.9 °F (37.2 °C) (Temporal)   Ht 165.1 cm (65\")   Wt 92.1 kg (203 lb)   SpO2 98%   BMI 33.78 kg/m²     BP Readings from Last 3 Encounters:   09/08/23 131/85   08/09/23 136/97   08/07/23 140/90     Wt Readings from Last 3 Encounters:   09/08/23 92.1 kg (203 lb)   08/09/23 94.8 kg (209 lb)   08/07/23 94.3 kg (207 lb 14.3 oz)        Physical Exam  Vitals reviewed.   Constitutional:       General: She is not in acute distress.     Appearance: Normal appearance. She is not ill-appearing, toxic-appearing or diaphoretic.   HENT:      Head: Normocephalic and atraumatic.      Right Ear: Tympanic membrane, ear canal and external ear normal.      Left Ear: Tympanic membrane, ear canal and external ear normal.      Mouth/Throat:      Mouth: Mucous membranes are moist.      Pharynx: " Oropharynx is clear. Posterior oropharyngeal erythema present. No oropharyngeal exudate.   Eyes:      Comments: Mild injection bilateral conjunctiva   Cardiovascular:      Rate and Rhythm: Normal rate and regular rhythm.      Pulses: Normal pulses.      Heart sounds: Normal heart sounds. No murmur heard.    No friction rub. No gallop.   Pulmonary:      Effort: Pulmonary effort is normal. No respiratory distress.      Breath sounds: Normal breath sounds. No stridor. No wheezing, rhonchi or rales.   Chest:      Chest wall: No tenderness.   Abdominal:      General: Abdomen is flat.      Palpations: Abdomen is soft. There is no mass.      Tenderness: There is no abdominal tenderness.   Musculoskeletal:      Right lower leg: No edema.      Left lower leg: No edema.   Skin:     General: Skin is warm and dry.   Neurological:      General: No focal deficit present.      Mental Status: She is alert. Mental status is at baseline.   Psychiatric:         Mood and Affect: Mood normal.         Behavior: Behavior normal.         Thought Content: Thought content normal.         Judgment: Judgment normal.     Result Review :   The following data was reviewed by: Cachorro Bai MD on 09/08/2023:  Common labs          6/1/2023    15:14 8/7/2023    19:03   Common Labs   Glucose 93     BUN 11     Creatinine 0.72     Sodium 137     Potassium 3.9     Chloride 100     Calcium 9.9     Albumin 4.8     Total Bilirubin 0.5     Alkaline Phosphatase 69     AST (SGOT) 26     ALT (SGPT) 23     WBC 5.87  8.12    Hemoglobin 15.1  13.9    Hematocrit 44.3  38.4    Platelets 242  251    Total Cholesterol 209     Triglycerides 252     HDL Cholesterol 43     LDL Cholesterol  122              Lab Results   Component Value Date    COVID19 NOT DETECTED 03/23/2021    RAPSCRN Negative 09/08/2023     Procedures        Assessment and Plan    Diagnoses and all orders for this visit:    1. Otalgia, unspecified laterality (Primary)    2. Eye drainage    3. Sore  throat  -     POCT rapid strep A  -     phenol (CHLORASEPTIC) 1.4 % liquid liquid; Apply 1 spray to the mouth or throat Every 2 (Two) Hours As Needed (sore throat).  Dispense: 177 mL; Refill: 0    4. Conjunctivitis of both eyes, unspecified conjunctivitis type  -     ofloxacin (Ocuflox) 0.3 % ophthalmic solution; Administer 1 drop to both eyes 4 (Four) Times a Day for 7 days.  Dispense: 5 mL; Refill: 0      -recommended dispose of previous contact lenses, and no new contact lenses until after treatment and resolution of treatment    There are no discontinued medications.       Follow Up   Return if symptoms worsen or fail to improve.  Patient was given instructions and counseling regarding her condition or for health maintenance advice. Please see specific information pulled into the AVS if appropriate.       Cachorro Bai MD  09/08/23  17:33 EDT

## 2023-10-03 ENCOUNTER — OFFICE VISIT (OUTPATIENT)
Dept: INTERNAL MEDICINE | Facility: CLINIC | Age: 28
End: 2023-10-03
Payer: COMMERCIAL

## 2023-10-03 VITALS
WEIGHT: 200 LBS | HEIGHT: 65 IN | OXYGEN SATURATION: 97 % | SYSTOLIC BLOOD PRESSURE: 131 MMHG | DIASTOLIC BLOOD PRESSURE: 86 MMHG | TEMPERATURE: 99 F | HEART RATE: 80 BPM | BODY MASS INDEX: 33.32 KG/M2

## 2023-10-03 DIAGNOSIS — F33.0 MILD EPISODE OF RECURRENT MAJOR DEPRESSIVE DISORDER: ICD-10-CM

## 2023-10-03 DIAGNOSIS — F41.1 GAD (GENERALIZED ANXIETY DISORDER): ICD-10-CM

## 2023-10-03 DIAGNOSIS — Z23 ENCOUNTER FOR IMMUNIZATION: Primary | ICD-10-CM

## 2023-10-03 LAB — TSH SERPL DL<=0.05 MIU/L-ACNC: 1.37 UIU/ML (ref 0.27–4.2)

## 2023-10-03 PROCEDURE — 84443 ASSAY THYROID STIM HORMONE: CPT | Performed by: NURSE PRACTITIONER

## 2023-10-03 RX ORDER — SERTRALINE HYDROCHLORIDE 100 MG/1
100 TABLET, FILM COATED ORAL DAILY
Qty: 30 TABLET | Refills: 1 | Status: SHIPPED | OUTPATIENT
Start: 2023-10-03

## 2023-10-03 NOTE — PROGRESS NOTES
"Chief Complaint  Follow-up (Follow up on Anxiety medication ), Anxiety, and Flu Vaccine    Subjective          Jaimie Dean presents to [facility]  History of Present Illness  Anxiety  Presents for follow-up visit. Symptoms include decreased concentration, depressed mood, excessive worry, irritability and nervous/anxious behavior. Patient reports no chest pain, compulsions, confusion, dizziness, dry mouth, feeling of choking, hyperventilation, impotence, insomnia, malaise, muscle tension, nausea, obsessions, palpitations, panic, restlessness, shortness of breath or suicidal ideas.     Her past medical history is significant for depression.     Depression  Visit Type: follow-up   Patient presents with the following symptoms: decreased concentration, depressed mood, excessive worry, irritability and nervousness/anxiety.  Patient is not experiencing: anhedonia, chest pain, choking sensation, compulsions, confusion, dizziness, dry mouth, fatigue, feelings of hopelessness, feelings of worthlessness, hypersomnia, hyperventilation, impotence, insomnia, malaise, memory impairment, muscle tension, nausea, obsessions, palpitations, panic, psychomotor agitation, psychomotor retardation, restlessness, shortness of breath, suicidal ideas, suicidal planning, thoughts of death, weight gain and weight loss.      Started Zoloft at last visit.     Current Outpatient Medications   Medication Instructions    sertraline (ZOLOFT) 100 mg, Oral, Daily       The following portions of the patient's history were reviewed and updated as appropriate: allergies, current medications, past family history, past medical history, past social history, past surgical history, and problem list.    Objective   Vital Signs:   /86 (BP Location: Left arm, Patient Position: Sitting)   Pulse 80   Temp 99 °F (37.2 °C) (Temporal)   Ht 165.1 cm (65\")   Wt 90.7 kg (200 lb)   SpO2 97%   BMI 33.28 " kg/m²     BP Readings from Last 3 Encounters:   10/03/23 131/86   09/08/23 131/85   08/09/23 136/97     Wt Readings from Last 3 Encounters:   10/03/23 90.7 kg (200 lb)   09/08/23 92.1 kg (203 lb)   08/09/23 94.8 kg (209 lb)           Physical Exam  Constitutional:       Appearance: She is obese.        Appearance: No acute distress, well-nourished  Head: normocephalic, atraumatic  Eyes: extraocular movements intact, no scleral icterus, no conjunctival injection  Ears, Nose, and Throat: external ears normal, nares patent, moist mucous membranes  Cardiovascular: regular rate and rhythm. no murmurs, rubs, or gallops. no edema  Respiratory: breathing comfortably, symmetric chest rise, clear to auscultation bilaterally. No wheezes, rales, or rhonchi.  Neuro: alert and oriented to time, place, and person. Normal gait  Psych: normal mood and affect     Result Review :   The following data was reviewed by: MICHAEL Church on 10/03/2023:  Common labs          6/1/2023    15:14 8/7/2023    19:03   Common Labs   Glucose 93     BUN 11     Creatinine 0.72     Sodium 137     Potassium 3.9     Chloride 100     Calcium 9.9     Albumin 4.8     Total Bilirubin 0.5     Alkaline Phosphatase 69     AST (SGOT) 26     ALT (SGPT) 23     WBC 5.87  8.12    Hemoglobin 15.1  13.9    Hematocrit 44.3  38.4    Platelets 242  251    Total Cholesterol 209     Triglycerides 252     HDL Cholesterol 43     LDL Cholesterol  122              Lab Results   Component Value Date    COVID19 NOT DETECTED 03/23/2021    RAPSCRN Negative 09/08/2023       Procedures        Assessment and Plan    Diagnoses and all orders for this visit:    1. Encounter for immunization (Primary)    2. Mild episode of recurrent major depressive disorder  Assessment & Plan:  Patient's depression is recurrent and is mild without psychosis. Their depression is currently active and the condition is improving with treatment. This will be reassessed in 4 weeks. F/U as  described:Increasing Zoloft to 100 mg daily .    Orders:  -     sertraline (Zoloft) 100 MG tablet; Take 1 tablet by mouth Daily.  Dispense: 30 tablet; Refill: 1  -     TSH Rfx On Abnormal To Free T4    3. DIANA (generalized anxiety disorder)  Assessment & Plan:  Psychological condition is improving with treatment.  Regular aerobic exercise.  Medication changes per orders.  Psychological condition  will be reassessed in 4 weeks.    Increasing Zoloft to 100 mg daily     Orders:  -     sertraline (Zoloft) 100 MG tablet; Take 1 tablet by mouth Daily.  Dispense: 30 tablet; Refill: 1  -     TSH Rfx On Abnormal To Free T4    Other orders  -     Fluzone >6 Months ([id number])          Medications Discontinued During This Encounter   Medication Reason    phenol (CHLORASEPTIC) 1.4 % liquid liquid Historical Med - Therapy completed    sertraline (Zoloft) 50 MG tablet           Follow Up   Return in about 4 weeks (around 10/31/2023) for Anxiety, Depression.  Patient was given instructions and counseling regarding her condition or for health maintenance advice. Please see specific information pulled into the AVS if appropriate.       Soniya Ludwig, MICHAEL  10/04/23  09:30 EDT

## 2023-10-04 NOTE — ASSESSMENT & PLAN NOTE
Patient's depression is recurrent and is mild without psychosis. Their depression is currently active and the condition is improving with treatment. This will be reassessed in 4 weeks. F/U as described:Increasing Zoloft to 100 mg daily .

## 2023-10-04 NOTE — ASSESSMENT & PLAN NOTE
Patient's (Body mass index is 33.28 kg/m².) indicates that they are obese (BMI >30) with health conditions that include none . Weight is unchanged. BMI  is above average; BMI management plan is completed. We discussed low calorie, low carb based diet program, portion control, and increasing exercise.

## 2023-10-04 NOTE — ASSESSMENT & PLAN NOTE
Psychological condition is improving with treatment.  Regular aerobic exercise.  Medication changes per orders.  Psychological condition  will be reassessed in 4 weeks.    Increasing Zoloft to 100 mg daily

## 2023-10-11 DIAGNOSIS — F41.1 GAD (GENERALIZED ANXIETY DISORDER): ICD-10-CM

## 2023-10-11 DIAGNOSIS — F33.0 MILD EPISODE OF RECURRENT MAJOR DEPRESSIVE DISORDER: ICD-10-CM

## 2023-10-12 RX ORDER — SERTRALINE HYDROCHLORIDE 100 MG/1
100 TABLET, FILM COATED ORAL DAILY
Qty: 90 TABLET | OUTPATIENT
Start: 2023-10-12

## 2023-12-11 ENCOUNTER — TELEPHONE (OUTPATIENT)
Dept: OBSTETRICS AND GYNECOLOGY | Facility: CLINIC | Age: 28
End: 2023-12-11
Payer: COMMERCIAL

## 2023-12-11 DIAGNOSIS — Z32.00 ENCOUNTER FOR PREGNANCY TEST, RESULT UNKNOWN: Primary | ICD-10-CM

## 2023-12-11 DIAGNOSIS — O36.80X0 PREGNANCY WITH UNCERTAIN FETAL VIABILITY, SINGLE OR UNSPECIFIED FETUS: ICD-10-CM

## 2023-12-11 NOTE — TELEPHONE ENCOUNTER
Provider: DO DURAN     Caller: AMBER JOSEPH    Phone Number: [phone]    Reason for Call: PATIENT WAS CALLING IN FOR SCHEDULING FOR NEW OB BUT WAS ALSO CONCERNED BECAUSE JUST HAD A MISCARRIAGE IN AUGUST AT 10 WEEKS PREGNANT//PLEASE FOLLOW UP IF NEEDS SOONER SCHEDULING

## 2023-12-14 DIAGNOSIS — F41.1 GAD (GENERALIZED ANXIETY DISORDER): ICD-10-CM

## 2023-12-14 DIAGNOSIS — F33.0 MILD EPISODE OF RECURRENT MAJOR DEPRESSIVE DISORDER: ICD-10-CM

## 2023-12-14 RX ORDER — SERTRALINE HYDROCHLORIDE 100 MG/1
100 TABLET, FILM COATED ORAL DAILY
Qty: 30 TABLET | Refills: 1 | Status: SHIPPED | OUTPATIENT
Start: 2023-12-14

## 2023-12-14 NOTE — TELEPHONE ENCOUNTER
Patient contacted and scheduled for HCG and US per protocol. She has already been scheduled for the Initial OB with you 1/31. Her LMP is 11/9. She is coming in tomorrow for the lab and the US with follow up in 1/11. Please sign the attached orders.

## 2023-12-15 ENCOUNTER — LAB (OUTPATIENT)
Dept: OBSTETRICS AND GYNECOLOGY | Facility: CLINIC | Age: 28
End: 2023-12-15
Payer: COMMERCIAL

## 2023-12-15 DIAGNOSIS — Z32.00 ENCOUNTER FOR PREGNANCY TEST, RESULT UNKNOWN: ICD-10-CM

## 2023-12-15 LAB — HCG INTACT+B SERPL-ACNC: 8142 MIU/ML

## 2023-12-15 PROCEDURE — 84702 CHORIONIC GONADOTROPIN TEST: CPT | Performed by: OBSTETRICS & GYNECOLOGY

## 2023-12-19 ENCOUNTER — TELEPHONE (OUTPATIENT)
Dept: OBSTETRICS AND GYNECOLOGY | Facility: CLINIC | Age: 28
End: 2023-12-19
Payer: COMMERCIAL

## 2023-12-19 NOTE — TELEPHONE ENCOUNTER
----- Message from Violette Sands DO sent at 12/16/2023  9:48 AM EST -----  B-hcg is elevated, recommend TVUS for dating/viability ASAP. Give ectopic and miscarriage precautions.   Electronically signed by:    Violette Sands DO  12/16/23  09:48 EST

## 2024-01-11 ENCOUNTER — OFFICE VISIT (OUTPATIENT)
Dept: OBSTETRICS AND GYNECOLOGY | Facility: CLINIC | Age: 29
End: 2024-01-11

## 2024-01-11 VITALS
BODY MASS INDEX: 33.95 KG/M2 | HEIGHT: 65 IN | DIASTOLIC BLOOD PRESSURE: 90 MMHG | SYSTOLIC BLOOD PRESSURE: 136 MMHG | WEIGHT: 203.8 LBS

## 2024-01-11 DIAGNOSIS — O36.80X0 PREGNANCY WITH INCONCLUSIVE FETAL VIABILITY, SINGLE OR UNSPECIFIED FETUS: Primary | ICD-10-CM

## 2024-01-11 RX ORDER — PRENATAL VIT NO.126/IRON/FOLIC 28MG-0.8MG
TABLET ORAL DAILY
COMMUNITY

## 2024-01-11 NOTE — PROGRESS NOTES
Us only visit, see Media for US report.     Electronically signed by:    Violette Sands DO  01/11/24  14:48 EST

## 2024-01-30 PROBLEM — Z34.80 SUPERVISION OF OTHER NORMAL PREGNANCY, ANTEPARTUM: Status: ACTIVE | Noted: 2024-01-30

## 2024-01-31 ENCOUNTER — INITIAL PRENATAL (OUTPATIENT)
Dept: OBSTETRICS AND GYNECOLOGY | Facility: CLINIC | Age: 29
End: 2024-01-31
Payer: COMMERCIAL

## 2024-01-31 VITALS — DIASTOLIC BLOOD PRESSURE: 80 MMHG | SYSTOLIC BLOOD PRESSURE: 118 MMHG | WEIGHT: 206.4 LBS | BODY MASS INDEX: 34.35 KG/M2

## 2024-01-31 DIAGNOSIS — F32.89 OTHER DEPRESSION: ICD-10-CM

## 2024-01-31 DIAGNOSIS — Z34.80 SUPERVISION OF OTHER NORMAL PREGNANCY, ANTEPARTUM: Primary | ICD-10-CM

## 2024-01-31 PROBLEM — F33.0 MILD EPISODE OF RECURRENT MAJOR DEPRESSIVE DISORDER: Status: RESOLVED | Noted: 2023-06-06 | Resolved: 2024-01-31

## 2024-01-31 PROBLEM — F32.A DEPRESSION: Status: ACTIVE | Noted: 2024-01-31

## 2024-01-31 PROBLEM — E66.01 CLASS 2 SEVERE OBESITY WITH SERIOUS COMORBIDITY AND BODY MASS INDEX (BMI) OF 35.0 TO 35.9 IN ADULT: Status: RESOLVED | Noted: 2023-07-05 | Resolved: 2024-01-31

## 2024-01-31 LAB
ABO GROUP BLD: NORMAL
AMPHET+METHAMPHET UR QL: NEGATIVE
BARBITURATES UR QL SCN: NEGATIVE
BASOPHILS # BLD AUTO: 0.01 10*3/MM3 (ref 0–0.2)
BASOPHILS NFR BLD AUTO: 0.1 % (ref 0–1.5)
BENZODIAZ UR QL SCN: NEGATIVE
BLD GP AB SCN SERPL QL: NEGATIVE
CANNABINOIDS SERPL QL: NEGATIVE
COCAINE UR QL: NEGATIVE
DEPRECATED RDW RBC AUTO: 43.8 FL (ref 37–54)
EOSINOPHIL # BLD AUTO: 0.06 10*3/MM3 (ref 0–0.4)
EOSINOPHIL NFR BLD AUTO: 0.9 % (ref 0.3–6.2)
ERYTHROCYTE [DISTWIDTH] IN BLOOD BY AUTOMATED COUNT: 12.8 % (ref 12.3–15.4)
FENTANYL UR-MCNC: NEGATIVE NG/ML
GLUCOSE UR STRIP-MCNC: NEGATIVE MG/DL
HBA1C MFR BLD: 4.8 % (ref 4.8–5.6)
HBV SURFACE AG SERPL QL IA: NORMAL
HCT VFR BLD AUTO: 39 % (ref 34–46.6)
HCV AB SER DONR QL: NORMAL
HGB BLD-MCNC: 13.3 G/DL (ref 12–15.9)
IMM GRANULOCYTES # BLD AUTO: 0.02 10*3/MM3 (ref 0–0.05)
IMM GRANULOCYTES NFR BLD AUTO: 0.3 % (ref 0–0.5)
LYMPHOCYTES # BLD AUTO: 1.45 10*3/MM3 (ref 0.7–3.1)
LYMPHOCYTES NFR BLD AUTO: 20.6 % (ref 19.6–45.3)
MCH RBC QN AUTO: 32.1 PG (ref 26.6–33)
MCHC RBC AUTO-ENTMCNC: 34.1 G/DL (ref 31.5–35.7)
MCV RBC AUTO: 94.2 FL (ref 79–97)
METHADONE UR QL SCN: NEGATIVE
MONOCYTES # BLD AUTO: 0.64 10*3/MM3 (ref 0.1–0.9)
MONOCYTES NFR BLD AUTO: 9.1 % (ref 5–12)
NEUTROPHILS NFR BLD AUTO: 4.86 10*3/MM3 (ref 1.7–7)
NEUTROPHILS NFR BLD AUTO: 69 % (ref 42.7–76)
NRBC BLD AUTO-RTO: 0 /100 WBC (ref 0–0.2)
OPIATES UR QL: NEGATIVE
OXYCODONE UR QL SCN: NEGATIVE
PLATELET # BLD AUTO: 246 10*3/MM3 (ref 140–450)
PMV BLD AUTO: 11.9 FL (ref 6–12)
PROT UR STRIP-MCNC: NEGATIVE MG/DL
RBC # BLD AUTO: 4.14 10*6/MM3 (ref 3.77–5.28)
RH BLD: POSITIVE
T PALLIDUM IGG SER QL: NORMAL
WBC NRBC COR # BLD AUTO: 7.04 10*3/MM3 (ref 3.4–10.8)

## 2024-01-31 PROCEDURE — 86900 BLOOD TYPING SEROLOGIC ABO: CPT | Performed by: OBSTETRICS & GYNECOLOGY

## 2024-01-31 PROCEDURE — 87661 TRICHOMONAS VAGINALIS AMPLIF: CPT | Performed by: OBSTETRICS & GYNECOLOGY

## 2024-01-31 PROCEDURE — 86780 TREPONEMA PALLIDUM: CPT | Performed by: OBSTETRICS & GYNECOLOGY

## 2024-01-31 PROCEDURE — 80307 DRUG TEST PRSMV CHEM ANLYZR: CPT | Performed by: OBSTETRICS & GYNECOLOGY

## 2024-01-31 PROCEDURE — 83036 HEMOGLOBIN GLYCOSYLATED A1C: CPT | Performed by: OBSTETRICS & GYNECOLOGY

## 2024-01-31 PROCEDURE — 87591 N.GONORRHOEAE DNA AMP PROB: CPT | Performed by: OBSTETRICS & GYNECOLOGY

## 2024-01-31 PROCEDURE — 86850 RBC ANTIBODY SCREEN: CPT | Performed by: OBSTETRICS & GYNECOLOGY

## 2024-01-31 PROCEDURE — 86901 BLOOD TYPING SEROLOGIC RH(D): CPT | Performed by: OBSTETRICS & GYNECOLOGY

## 2024-01-31 PROCEDURE — 86803 HEPATITIS C AB TEST: CPT | Performed by: OBSTETRICS & GYNECOLOGY

## 2024-01-31 PROCEDURE — 85025 COMPLETE CBC W/AUTO DIFF WBC: CPT | Performed by: OBSTETRICS & GYNECOLOGY

## 2024-01-31 PROCEDURE — 87340 HEPATITIS B SURFACE AG IA: CPT | Performed by: OBSTETRICS & GYNECOLOGY

## 2024-01-31 PROCEDURE — 87491 CHLMYD TRACH DNA AMP PROBE: CPT | Performed by: OBSTETRICS & GYNECOLOGY

## 2024-01-31 PROCEDURE — 87086 URINE CULTURE/COLONY COUNT: CPT | Performed by: OBSTETRICS & GYNECOLOGY

## 2024-01-31 NOTE — PROGRESS NOTES
OBSTETRIC HISTORY AND PHYSICAL     Subjective:  Jaimie Dean is a 28 y.o.  at 11w6d  here for her new OB visit. Patient pregnancy is dated by an LMP, confirmed with early US. +FHTS on BSUS today. Patient's pregnancy is complicated by depression(on meds).   She is taking her prenatal vitamins.Reports no loss of fluid or vaginal bleeding.No hx of genetic, bleeding, endocrine, chromosome disorder in both patient and partner. No history of multiple gestations, congenital anomalies or mental retardation.    FOB involvement: yes, same FOB   Pediatrician: yes  Breast/Bottle: breast   Epidural: yes  Discussed adequate water intake, food guidelines/weight gain, limit caffiene to less than 200mg daily.   Discussed food, activities to avoid. Discussed seatbelt safety.   Reviewed safe meds in pregnancy handout.  Taking PNV: yes  Smoking cessation needed: no     Reviewed and updated:  OBHx, GYNHx (STDs), PMHx, Medications, Allergies, PSHx, Social Hx, Preventative Hx (PAP), Hx of abuse/safe environment, Vaccine Hx including hx of chickenpox or vaccine, Genetic Hx (pt, FOB, both families).        OB History    Para Term  AB Living   3 1 1   1 1   SAB IAB Ectopic Molar Multiple Live Births   1         1      # Outcome Date GA Lbr Joel/2nd Weight Sex Delivery Anes PTL Lv   3 Current            2 SAB 23 6w0d    SAB      1 Term 21 40w2d   M Vag-Spont  N JEZ     Past Medical History:   Diagnosis Date    Anxiety     Class 2 severe obesity with serious comorbidity and body mass index (BMI) of 35.0 to 35.9 in adult 2023    Depression 2024    Threatened  2023    US from 23 shows IUP with yolk sac and fetal pole which measures 6 weeks, no FHTs seen.  Bleeding precautions reviewed, will plan follow up ultrasound in one week.  Pelvic rest.     No past surgical history on file.  Family History   Problem Relation Age of Onset    Lung cancer Paternal Grandfather     Uterine cancer  Maternal Grandmother     Breast cancer Neg Hx     Ovarian cancer Neg Hx      Allergies   Allergen Reactions    Amoxicillin Rash     Social History     Socioeconomic History    Marital status:    Tobacco Use    Smoking status: Never     Passive exposure: Never    Smokeless tobacco: Never   Vaping Use    Vaping Use: Never used   Substance and Sexual Activity    Alcohol use: Not Currently     Comment: occ    Drug use: Never    Sexual activity: Yes     Partners: Male     Birth control/protection: None           ROS:  General ROS: negative for - chills or fatigue  Respiratory ROS: negative for - cough or hemoptysis  Cardiovascular ROS: negative for - chest pain or dyspnea on exertion  Gastrointestinal ROS: negative for - abdominal pain or appetite loss  Musculoskeletal ROS: negative for - gait disturbance or joint pain  Neurological ROS: negative for - behavioral changes or bowel and bladder control changes  Dermatological ROS: negative for rashes or lesions     Objective:  Physical Exam:   Vitals:    01/31/24 1331   BP: 118/80       General appearance - alert, well appearing, and in no distress  Mental status - alert, oriented to person, place, and time  Neck- Supple.  No nodularity or enlargement.  Heart- Regular rate and rhythm without murmur, gallop or rub.  Lungs- Clear to auscultation bilaterally, negative W/R/R  Breasts- Deferred to annual  Abdomen- Soft, Gravid uterus, non-tender  Extremeties: Normal ROM, Negative swelling or cyanosis  Neurological: Gait normal, Negative tingling or loss of sensation     Counseling:   Nutrition discussed, calories, activity/exercise in pregnancy  Discussed dietary restrictions/safety food preparation in pregnancy  Reviewed what to expect prenatal visits, office providers (female and male) and covering [facility]/Dr. Carr  Appropriate trimester precautions provided, N/V, vag bleeding, cramping  VACCINE importance in pregnancy discussed.  Maternal and fetal risk of  not being vaccinated reviewed NLT increased risk maternal/fetal severity of illness/death, PTD, CS, hemorrhage, HTN, possible IUFD.  Significant maternal and fetal/infant benefit w vaccination.  FDA approval and ACOG/SMFM/CDC strong recommendation in pregnancy.  Questions answered.   Questions answered  ANXIETY/DEPRESSION- We discussed treatment options R/B/A/SE/E expectant, THERAPY, SSRI, vs SSRI + Therapy.  Non medical options usually recommended first.  GAMALIEL reviewed.  Ideally weaning in third tri if possible. Some studies indicate child w increased risk Dep/Anx w SSRI use in preg.  Black box warning.  Recommend avoid abrupt discontinuation.  Discussed healthy weight gain.  Also discussed increased water intake, 200mg of caffeine daily, exercise and healthy eating habits.    Encouraged patient to consider breastfeeding. Discussed that it is recommended by the CDC and WHO that women exclusively breastfeed for the first 6 months and continue to breastfeed up to one year. Discussed the health benefits of breastfeeding, including increased immune systems in infants, reduced risk of food allergies, and reduced risk of chronic disease as an adult. Maternal benefits include more PP weight loss, reduced risk of PP depression, breast/ovarian cancer risk reduced.     ASSESSMENT/PLAN:   Diagnoses and all orders for this visit:    1. Supervision of other normal pregnancy, antepartum (Primary)  Assessment & Plan:  TERESA finalize:Estimated Date of Delivery. Estimated Date of Delivery: 8/15/24 based on LMP, confirmed with BSUS       Genetic testing (NIPS-Quad)/CF/AFP:  ordered new OB     COVID: Recommended  Flu: Recommended   Tdap:Script 27-36 weeks   RSV: Script 32-36 weeks     Rhogam: Rh +    Sterilization:    Anatomy US:ordered   FU US:    EPDS: 6, new OB visit   GTT:     PROBLEM LIST/PLAN:   Depression, stable on Zoloft 100mg daily     Orders:  -     POC Urinalysis Dipstick  -     OB Panel With HIV  -     Hemoglobinopathy  Fractionation Cascade  -     Hemoglobin A1c  -     Chlamydia trachomatis, Neisseria gonorrhoeae, PCR - Urine, Urine, Random Void  -     Urine Culture - Urine, Urine, Clean Catch  -     Urine Drug Screen - Urine, Clean Catch  -     Inheritest (R) CF/SMA Panel - Blood, Arm, Left  -     [id number] PLUS Core+SCA+ESS - Blood, Arm, Left  -     US Ob 14 + Weeks Single or First Gestation; Future    2. Other depression  Assessment & Plan:  Zoloft 100mg daily   EPDS 6 at new Ob visit           Problems Addressed this Visit          Gravid and     Supervision of other normal pregnancy, antepartum - Primary     TERESA finalize:Estimated Date of Delivery. Estimated Date of Delivery: 8/15/24 based on LMP, confirmed with BSUS       Genetic testing (NIPS-Quad)/CF/AFP:  ordered new OB     COVID: Recommended  Flu: Recommended   Tdap:Script 27-36 weeks   RSV: Script 32-36 weeks     Rhogam: Rh +    Sterilization:    Anatomy US:ordered   FU US:    EPDS: 6, new OB visit   GTT:     PROBLEM LIST/PLAN:   Depression, stable on Zoloft 100mg daily          Relevant Orders    POC Urinalysis Dipstick (Completed)    OB Panel With HIV    Hemoglobinopathy Fractionation Cascade    Hemoglobin A1c    Chlamydia trachomatis, Neisseria gonorrhoeae, PCR - Urine, Urine, Random Void    Urine Culture - Urine, Urine, Clean Catch    Urine Drug Screen - Urine, Clean Catch    Inheritest (R) CF/SMA Panel - Blood, Arm, Left    [id number] PLUS Core+SCA+ESS - Blood, Arm, Left    US Ob 14 + Weeks Single or First Gestation       Mental Health    Depression     Zoloft 100mg daily   EPDS 6 at new Ob visit           Diagnoses         Codes Comments    Supervision of other normal pregnancy, antepartum    -  Primary ICD-10-CM: Z34.80  ICD-9-CM: V22.1     Other depression     ICD-10-CM: F32.89  ICD-9-CM: 311                     Return in about 4 weeks (around 2024) for Routine OB visit.    We have gone over the expected prenatal care to include the timing and  content of visits including the anatomy ultrasound.  We discussed the content of the anatomy ultrasound and its limitations (the fact that ultrasound in general may not see up to 40% of abnormalities).  I informed her how to contact the office and/or on call person in the event of any problems and encouraged her to do so when she feels it is necessary.  We then spent time answering her questions which she indicated were answered to her satisfaction.    Violette Sands,   1/31/2024 14:47 EST

## 2024-01-31 NOTE — PATIENT INSTRUCTIONS
Venipuncture Blood Specimen Collection  Venipuncture performed in left arm by Analia Da Silva with good hemostasis. Patient tolerated the procedure well without complications.   01/31/24   Analia Da Silva

## 2024-01-31 NOTE — ASSESSMENT & PLAN NOTE
TERESA finalize:Estimated Date of Delivery. Estimated Date of Delivery: 8/15/24 based on LMP, confirmed with BSUS       Genetic testing (NIPS-Quad)/CF/AFP:  ordered new OB     COVID: Recommended  Flu: Recommended   Tdap:Script 27-36 weeks   RSV: Script 32-36 weeks     Rhogam: Rh +    Sterilization:    Anatomy US:ordered   FU US:    EPDS: 6, new OB visit   GTT:     PROBLEM LIST/PLAN:   Depression, stable on Zoloft 100mg daily

## 2024-02-01 LAB — BACTERIA SPEC AEROBE CULT: NORMAL

## 2024-02-02 LAB
HGB A MFR BLD ELPH: 97.5 % (ref 96.4–98.8)
HGB A2 MFR BLD ELPH: 2.5 % (ref 1.8–3.2)
HGB F MFR BLD ELPH: 0 % (ref 0–2)
HGB FRACT BLD-IMP: NORMAL
HGB S MFR BLD ELPH: 0 %
RUBV IGG SERPL IA-ACNC: 2.84 INDEX

## 2024-02-03 LAB
C TRACH RRNA SPEC QL NAA+PROBE: NEGATIVE
N GONORRHOEA RRNA SPEC QL NAA+PROBE: NEGATIVE

## 2024-02-04 LAB
5P15 DELETION (CRI-DU-CHAT): NOT DETECTED
CFDNA.FET/CFDNA.TOTAL SFR FETUS: NORMAL %
CITATION REF LAB TEST: NORMAL
FET 13+18+21+X+Y ANEUP PLAS.CFDNA: NEGATIVE
FET 1P36 DEL RISK WBC.DNA+CFDNA QL: NOT DETECTED
FET 22Q11.2 DEL RISK WBC.DNA+CFDNA QL: NOT DETECTED
FET CHR 11Q23 DEL PLAS.CFDNA QL: NOT DETECTED
FET CHR 15Q11 DEL PLAS.CFDNA QL: NOT DETECTED
FET CHR 21 TS PLAS.CFDNA QL: NEGATIVE
FET CHR 4P16 DEL PLAS.CFDNA QL: NOT DETECTED
FET CHR 8Q24 DEL PLAS.CFDNA QL: NOT DETECTED
FET MS X RISK WBC.DNA+CFDNA QL: NOT DETECTED
FET SEX PLAS.CFDNA DOSAGE CFDNA: NORMAL
FET TS 13 RISK PLAS.CFDNA QL: NEGATIVE
FET TS 18 RISK WBC.DNA+CFDNA QL: NEGATIVE
FET X + Y ANEUP RISK PLAS.CFDNA SEQ-IMP: NOT DETECTED
GA EST FROM CONCEPTION DATE: NORMAL D
GESTATIONAL AGE > 9:: YES
LAB DIRECTOR NAME PROVIDER: NORMAL
LAB DIRECTOR NAME PROVIDER: NORMAL
LABORATORY COMMENT REPORT: NORMAL
LIMITATIONS OF THE TEST: NORMAL
NEGATIVE PREDICTIVE VALUE: NORMAL
NOTE: NORMAL
PERFORMANCE CHARACTERISTICS: NORMAL
POSITIVE PREDICTIVE VALUE: NORMAL
REF LAB TEST METHOD: NORMAL
T VAGINALIS RRNA SPEC QL NAA+PROBE: NEGATIVE
TEST PERFORMANCE INFO SPEC: NORMAL
TRIOSOMY 16: NOT DETECTED
TRISOMY 22: NOT DETECTED

## 2024-02-14 ENCOUNTER — PATIENT MESSAGE (OUTPATIENT)
Dept: INTERNAL MEDICINE | Facility: CLINIC | Age: 29
End: 2024-02-14
Payer: COMMERCIAL

## 2024-02-14 DIAGNOSIS — F41.1 GAD (GENERALIZED ANXIETY DISORDER): ICD-10-CM

## 2024-02-14 DIAGNOSIS — F33.0 MILD EPISODE OF RECURRENT MAJOR DEPRESSIVE DISORDER: ICD-10-CM

## 2024-02-14 RX ORDER — SERTRALINE HYDROCHLORIDE 100 MG/1
100 TABLET, FILM COATED ORAL DAILY
Qty: 30 TABLET | Refills: 1 | Status: SHIPPED | OUTPATIENT
Start: 2024-02-14

## 2024-02-14 RX ORDER — SERTRALINE HYDROCHLORIDE 100 MG/1
100 TABLET, FILM COATED ORAL DAILY
Qty: 90 TABLET | OUTPATIENT
Start: 2024-02-14

## 2024-02-16 LAB
CITATION REF LAB TEST: NORMAL
ETHNIC BACKGROUND STATED: NORMAL
GENE DIS ANL CARRIER INTERP-IMP: NORMAL
GENE STUDIED ID: NORMAL
LAB DIRECTOR NAME PROVIDER: NORMAL
Lab: NORMAL
MOL DX INTERP BLD/T QL: NORMAL
REASON FOR REFERRAL (NARRATIVE): NORMAL
RECOMMENDATION PATIENT DOC-IMP: NORMAL
REF LAB TEST METHOD: NORMAL
SERVICE CMNT-IMP: NORMAL
SPECIMEN SOURCE: NORMAL

## 2024-02-28 ENCOUNTER — ROUTINE PRENATAL (OUTPATIENT)
Dept: OBSTETRICS AND GYNECOLOGY | Facility: CLINIC | Age: 29
End: 2024-02-28
Payer: COMMERCIAL

## 2024-02-28 VITALS — BODY MASS INDEX: 33.98 KG/M2 | DIASTOLIC BLOOD PRESSURE: 80 MMHG | SYSTOLIC BLOOD PRESSURE: 133 MMHG | WEIGHT: 204.2 LBS

## 2024-02-28 DIAGNOSIS — Z34.80 SUPERVISION OF OTHER NORMAL PREGNANCY, ANTEPARTUM: Primary | ICD-10-CM

## 2024-02-28 DIAGNOSIS — F32.89 OTHER DEPRESSION: ICD-10-CM

## 2024-02-28 LAB
GLUCOSE UR STRIP-MCNC: ABNORMAL MG/DL
PROT UR STRIP-MCNC: NEGATIVE MG/DL

## 2024-02-28 PROCEDURE — 0502F SUBSEQUENT PRENATAL CARE: CPT | Performed by: OBSTETRICS & GYNECOLOGY

## 2024-02-28 NOTE — PROGRESS NOTES
Routine Prenatal Visit     Subjective  Jaimie Dean is a 29 y.o.  at 15w6d here for her routine OB visit.   She is taking her prenatal vitamins.Reports no loss of fluid or vaginal bleeding. Patient doing well without any complaints. Pregnancy is complicated by:     Patient Active Problem List   Diagnosis    Supervision of other normal pregnancy, antepartum    Depression         OB History    Para Term  AB Living   3 1 1   1 1   SAB IAB Ectopic Molar Multiple Live Births   1         1      # Outcome Date GA Lbr Joel/2nd Weight Sex Delivery Anes PTL Lv   3 Current            2 SAB 23 6w0d    SAB      1 Term 21 40w2d   M Vag-Spont  N JEZ           ROS:   General ROS: negative for - chills or fatigue  Respiratory ROS: negative for - cough or hemoptysis  Cardiovascular ROS: negative for - chest pain or dyspnea on exertion  Genito-Urinary ROS: negative for  change in urinary stream, vaginal discharge   Musculoskeletal ROS: negative for - gait disturbance or joint pain  Dermatological ROS: negative for acne,  dry skin or itching    Objective  Physical Exam:   Vitals:    24 1012   BP: 133/80       FHT: 110-160 BPM    General appearance - alert, well appearing, and in no distress  Mental status - alert, oriented to person, place, and time  Abdomen- Soft, Gravid uterus, non-tender to palpation  Musculoskeletal: negative for - gait disturbance or joint pain  Extremeties: negative swelling or cyanosis   Dermatological: negative rashes or skin lesions       Assessment/Plan:   Diagnoses and all orders for this visit:    1. Supervision of other normal pregnancy, antepartum (Primary)  -     POC Urinalysis Dipstick    2. Other depression  Assessment & Plan:  Zoloft 100mg daily   EPDS 6 at new Ob visit             Counseling:   Second trimester precautions  Round Ligament Pain:  The uterus has several ligaments which provide support and keep the uterus in place. As the  uterus grows these  ligaments are pulled and stretched which often causes sharp stabbing like pain in the inguinal area.   You may find a pregnancy support band helpful. Changing positions may also help. Yoga is a great way to cope with round ligament and low back pain in pregnancy.    Massage may also help with low back pain   Things to Consider at this Point in your Pregnancy:  Some women experience swelling in their feet during pregnancy. Compression stockings may help  Drink plenty of water and stay active   Make sure you are eating frequent small meals, nuts are a wonderful snack to keep with you            Return in about 4 weeks (around 3/27/2024) for Routine OB visit.      We have gone over prenatal care to include the timing and content of visits. I informed her how to contact the office and/or on call person in the event of any problems and encouraged her to do so when she feels it is necessary.  We then spent time answering her questions which she indicated were answered to her satisfaction.    Violette Sands DO  2/28/2024 10:33 EST

## 2024-03-29 ENCOUNTER — E-VISIT (OUTPATIENT)
Dept: ADMINISTRATIVE | Facility: OTHER | Age: 29
End: 2024-03-29
Payer: COMMERCIAL

## 2024-03-29 NOTE — E-VISIT ESCALATED
Chief Complaint: Cold, flu, COVID, sinus, hay fever, or seasonal allergies   Patient was shown the following escalation message:   Some conditions need a visit with a healthcare provider   Based on the description of your cough, your condition may be more complicated. You should speak with a provider to get care.   ----------   Patient Interview Transcript:   Which of these symptoms are bothering you? Select all that apply.    Cough    Runny nose    Fatigue or tiredness   Not selected:    Shortness of breath    Stuffed-up nose or sinuses    Itchy or watery eyes    Itchy nose or sneezing    Loss of smell or taste    Sore throat    Hoarse voice or loss of voice    Headache    Fever    Sweats    Chills    Muscle or body aches    Nausea or vomiting    Diarrhea    I don't have any of these symptoms   When did your current symptoms start? Select one.    10 to 14 days ago   Not selected:    Less than 48 hours ago    3 to 5 days ago    6 to 9 days ago    2 to 3 weeks ago    3 to 4 weeks ago    More than a month ago   Did your symptoms come on suddenly or gradually? Select one.    Suddenly   Not selected:    Gradually    I'm not sure   Have your symptoms improved at all since they began? Select one.    Yes, but they haven't gone away completely   Not selected:    Yes, but then they came back worse than before    No   Since your current symptoms started, have you been tested for COVID-19? This includes home self-tests as well as nose swab or saliva tests done at a doctor's office, lab, or testing site. Select one.    No   Not selected:    Yes   Taking a home COVID test can help your provider give you the best care. - If you have a COVID test kit, take the test now before continuing this interview. - If you choose not to take a test or don't have one, you should still continue this interview. Your provider can still help you get care. Do you have a COVID test kit? Select one.    No, I don't have a test kit   Not selected:    " Yes, and I'll take a test now    Yes, but I prefer not to take a test now   Has anyone in your household tested positive for COVID-19 in the past 14 days? Select one.    No   Not selected:    Yes   In the last 14 days, have you had close contact with someone who has COVID-19? \"Close contact\" means any of these: - Caring for someone with COVID-19. - Being within 6 feet of someone with COVID-19 for a total of at least 15 minutes over a 24-hour period. For example, three 5-minute exposures for a total of 15 minutes. - Being in direct contact with respiratory droplets from someone with COVID-19 (being coughed on, kissing, sharing utensils). Select one.    No, not that I know of   Not selected:    Yes, a confirmed case    Yes, a suspected case   Have you gotten the [id number] updated COVID-19 vaccine? This means either the updated Pfizer-BioNTech or Moderna vaccine after September 12, 2023; or the updated Novavax vaccine after October 3, 2023. Select one.    No   Not selected:    Yes   Since your symptoms started, have you felt dizzy? Select one.    Yes, but I can still do my regular daily activities   Not selected:    Yes, and it makes it hard to stand, walk, or do daily activities    No   Do you have chest pain? You might also feel it as discomfort, aching, tightness, or squeezing in the chest. Select one.    No   Not selected:    Yes   Do you have any of these devices at home? Select all that apply.    No   Not selected:    A home pulse oximeter    Home blood pressure monitor    Apple Watch or other smart watch    FitBit or other smart wristband    Other wearable device   Do you cough so hard that it's made you gag or vomit? By gag, we mean has your coughing made you choke or dry heave? Select all that apply.    Yes, my coughing has made me gag   Not selected:    Yes, my coughing has made me vomit    No   Does your cough come in spasms of 10 to 20 coughs in a row, followed by a loud whoop when breathing in? Select " one.    Yes   Not selected:    No   ----------   Medical history   Medical history data does not currently exist for this patient.

## 2024-04-03 ENCOUNTER — ROUTINE PRENATAL (OUTPATIENT)
Dept: OBSTETRICS AND GYNECOLOGY | Facility: CLINIC | Age: 29
End: 2024-04-03
Payer: COMMERCIAL

## 2024-04-03 VITALS — SYSTOLIC BLOOD PRESSURE: 128 MMHG | DIASTOLIC BLOOD PRESSURE: 84 MMHG | BODY MASS INDEX: 35.08 KG/M2 | WEIGHT: 210.8 LBS

## 2024-04-03 DIAGNOSIS — Z34.80 SUPERVISION OF OTHER NORMAL PREGNANCY, ANTEPARTUM: Primary | ICD-10-CM

## 2024-04-03 DIAGNOSIS — F32.89 OTHER DEPRESSION: ICD-10-CM

## 2024-04-03 LAB
GLUCOSE UR STRIP-MCNC: ABNORMAL MG/DL
PROT UR STRIP-MCNC: NEGATIVE MG/DL

## 2024-04-03 PROCEDURE — 0502F SUBSEQUENT PRENATAL CARE: CPT | Performed by: OBSTETRICS & GYNECOLOGY

## 2024-04-03 NOTE — PROGRESS NOTES
Routine Prenatal Visit     Subjective  Jaimie Dena is a 29 y.o.  at 20w6d here for her routine OB visit.   She is taking her prenatal vitamins.Reports no loss of fluid or vaginal bleeding. Patient doing well without any complaints. Pregnancy is complicated by:     Patient Active Problem List   Diagnosis    Supervision of other normal pregnancy, antepartum    Depression         OB History    Para Term  AB Living   3 1 1   1 1   SAB IAB Ectopic Molar Multiple Live Births   1         1      # Outcome Date GA Lbr Joel/2nd Weight Sex Type Anes PTL Lv   3 Current            2 SAB 23 6w0d    SAB      1 Term 21 40w2d   M Vag-Spont  N JEZ           ROS:   General ROS: negative for - chills or fatigue  Respiratory ROS: negative for - cough or hemoptysis  Cardiovascular ROS: negative for - chest pain or dyspnea on exertion  Genito-Urinary ROS: negative for  change in urinary stream, vaginal discharge   Musculoskeletal ROS: negative for - gait disturbance or joint pain  Dermatological ROS: negative for acne,  dry skin or itching    Objective  Physical Exam:   Vitals:    24 1008   BP: 128/84       Uterine Size: size equals dates  FHT: 110-160 BPM    General appearance - alert, well appearing, and in no distress  Mental status - alert, oriented to person, place, and time  Abdomen- Soft, Gravid uterus, non-tender to palpation  Musculoskeletal: negative for - gait disturbance or joint pain  Extremeties: negative swelling or cyanosis   Dermatological: negative rashes or skin lesions       Assessment/Plan:   Diagnoses and all orders for this visit:    1. Supervision of other normal pregnancy, antepartum (Primary)  -     POC Urinalysis Dipstick    2. Other depression  Assessment & Plan:  Zoloft 100mg daily   EPDS 6 at new Ob visit             Counseling:   Second trimester precautions  Round Ligament Pain:  The uterus has several ligaments which provide support and keep the uterus in  place. As the  uterus grows these ligaments are pulled and stretched which often causes sharp stabbing like pain in the inguinal area.   You may find a pregnancy support band helpful. Changing positions may also help. Yoga is a great way to cope with round ligament and low back pain in pregnancy.    Massage may also help with low back pain   Things to Consider at this Point in your Pregnancy:  Some women experience swelling in their feet during pregnancy. Compression stockings may help  Drink plenty of water and stay active   Make sure you are eating frequent small meals, nuts are a wonderful snack to keep with you            Return in about 4 weeks (around 5/1/2024) for Routine OB visit.      We have gone over prenatal care to include the timing and content of visits. I informed her how to contact the office and/or on call person in the event of any problems and encouraged her to do so when she feels it is necessary.  We then spent time answering her questions which she indicated were answered to her satisfaction.    Violette Sands DO  4/3/2024 10:33 EDT

## 2024-04-23 NOTE — PROGRESS NOTES
OB FOLLOW UP      Chief Complaint   Patient presents with    Routine Prenatal Visit     Subjective:   New pt to me  Over weekend felt heart was racing and irregular skipped beats.  No CP.  No SOA.  Lasted on and off all day.  One caffiene a day w Hardy or Dr. Pepper.  Never had w last baby.     Objective:  /76   Wt 96.6 kg (213 lb)   LMP 11/09/2023   BMI 35.45 kg/m²  4.536 kg (10 lb) Facility age limit for growth %nadia is 20 years.  See OB flow sheet for fundal height (not performed if US day of OV), FHT, edema, cvx exam if performed, and Upro/Uglu    CV: RRR no MRG. HR 94    Assessment and Plan:  24w4d     Diagnoses and all orders for this visit:    1. Supervision of other normal pregnancy, antepartum (Primary)  Overview:  TERESA finalized: 8/15/24 based on LMP, c/w 8week US    Genetic testing (NIPS-Quad)/CF/AFP: NIPS neg, XX, CF/SMA-neg    COVID: Recommended  Flu: Vaccinated  Tdap: Recommended, s/p Rx 4/29/2024     TPA 28-32weeks:  Sterilization:  Declines 4/29/2024     Anatomy US: 4/3/24 efw 73%, AC 65%, consistent with dating, All anatomy seen and WNL, Posterior placenta-grade 1, Female, Cephalic   FU US:    EPDS: 6, new OB visit   GTT:     PROBLEM LIST/PLAN:   Depression/Anxiety: Zoloft 100mg daily - continue.    Stable, wants to continue current dose.    Would like therapy referral.  No SI or HI. 4/29/2024 referral placed    Orders:  -     POC Urinalysis Dipstick  -     CBC (No Diff)  -     Gestational Diabetes Screen 1 Hour    2. Depression, unspecified depression type  Overview:  Zoloft 100mg daily   EPDS 6 at new Ob visit     Orders:  -     Ambulatory Referral to Behavioral Health      Counseling:    OB precautions, leaking, VB, jerrell cox vs PTL/Labor  FKC  VACCINE importance in pregnancy discussed.  Maternal and fetal risk of not being vaccinated reviewed NLT increased risk maternal/fetal severity of illness/death, PTD, CS, hemorrhage, HTN, possible IUFD.  Significant maternal and fetal/infant  benefit w vaccination.  FDA approval and ACOG/SMFM/CDC strong recommendation in pregnancy.  Questions answered.   We discussed potential etiology of her above-noted cardiac symptoms.  Increased blood volume in pregnancy reviewed.  We discussed importance of monitoring her symptoms.  If she has any more symptoms as noted above, I would recommend checking TSH, free T4 and consultation with cardiology for Holter monitor.  If she has any chest pain or shortness of breath, she is to be seen immediately in the emergency room.  Questions answered she desires to proceed as noted above.  She will call if the symptoms return.      Reassuring pregnancy progress. Questions answered.  Continue PNV.  Importance of healthy eating, obtaining sufficient sleep, and staying active unless hypertensive- activity modified as directed.    Return in about 4 weeks (around 5/27/2024) for FU OB, then [id number] X2.            Melva Ryan, DO  04/29/2024    [facility]  [address] [place] [address]  Dept: [phone]  Dept Fax: [phone]  Loc: [phone]  Loc Fax: [phone]

## 2024-04-25 DIAGNOSIS — F41.1 GAD (GENERALIZED ANXIETY DISORDER): ICD-10-CM

## 2024-04-25 DIAGNOSIS — F33.0 MILD EPISODE OF RECURRENT MAJOR DEPRESSIVE DISORDER: ICD-10-CM

## 2024-04-25 RX ORDER — SERTRALINE HYDROCHLORIDE 100 MG/1
100 TABLET, FILM COATED ORAL DAILY
Qty: 30 TABLET | Refills: 1 | Status: SHIPPED | OUTPATIENT
Start: 2024-04-25

## 2024-04-29 ENCOUNTER — TELEPHONE (OUTPATIENT)
Dept: OBSTETRICS AND GYNECOLOGY | Facility: CLINIC | Age: 29
End: 2024-04-29
Payer: COMMERCIAL

## 2024-04-29 ENCOUNTER — ROUTINE PRENATAL (OUTPATIENT)
Dept: OBSTETRICS AND GYNECOLOGY | Facility: CLINIC | Age: 29
End: 2024-04-29
Payer: COMMERCIAL

## 2024-04-29 VITALS — SYSTOLIC BLOOD PRESSURE: 124 MMHG | BODY MASS INDEX: 35.45 KG/M2 | WEIGHT: 213 LBS | DIASTOLIC BLOOD PRESSURE: 76 MMHG

## 2024-04-29 DIAGNOSIS — Z34.80 SUPERVISION OF OTHER NORMAL PREGNANCY, ANTEPARTUM: Primary | ICD-10-CM

## 2024-04-29 DIAGNOSIS — F32.A DEPRESSION, UNSPECIFIED DEPRESSION TYPE: ICD-10-CM

## 2024-04-29 LAB
DEPRECATED RDW RBC AUTO: 43.5 FL (ref 37–54)
ERYTHROCYTE [DISTWIDTH] IN BLOOD BY AUTOMATED COUNT: 12.7 % (ref 12.3–15.4)
GLUCOSE 1H P GLC SERPL-MCNC: 114 MG/DL (ref 65–139)
GLUCOSE UR STRIP-MCNC: NEGATIVE MG/DL
HCT VFR BLD AUTO: 34.4 % (ref 34–46.6)
HGB BLD-MCNC: 11.5 G/DL (ref 12–15.9)
MCH RBC QN AUTO: 31.9 PG (ref 26.6–33)
MCHC RBC AUTO-ENTMCNC: 33.4 G/DL (ref 31.5–35.7)
MCV RBC AUTO: 95.6 FL (ref 79–97)
PLATELET # BLD AUTO: 183 10*3/MM3 (ref 140–450)
PMV BLD AUTO: 11.9 FL (ref 6–12)
PROT UR STRIP-MCNC: NEGATIVE MG/DL
RBC # BLD AUTO: 3.6 10*6/MM3 (ref 3.77–5.28)
WBC NRBC COR # BLD AUTO: 8.57 10*3/MM3 (ref 3.4–10.8)

## 2024-04-29 PROCEDURE — 0502F SUBSEQUENT PRENATAL CARE: CPT | Performed by: OBSTETRICS & GYNECOLOGY

## 2024-04-29 PROCEDURE — 36415 COLL VENOUS BLD VENIPUNCTURE: CPT | Performed by: OBSTETRICS & GYNECOLOGY

## 2024-04-29 PROCEDURE — 85027 COMPLETE CBC AUTOMATED: CPT | Performed by: OBSTETRICS & GYNECOLOGY

## 2024-04-29 PROCEDURE — 82950 GLUCOSE TEST: CPT | Performed by: OBSTETRICS & GYNECOLOGY

## 2024-04-29 NOTE — PATIENT INSTRUCTIONS
Venipuncture Blood Specimen Collection  Venipuncture performed in left arm by Analia Da Silva with good hemostasis. Patient tolerated the procedure well without complications.   04/29/24   Analia Da Silva

## 2024-05-02 ENCOUNTER — OFFICE VISIT (OUTPATIENT)
Dept: PSYCHIATRY | Facility: CLINIC | Age: 29
End: 2024-05-02
Payer: COMMERCIAL

## 2024-05-02 VITALS
BODY MASS INDEX: 34.47 KG/M2 | SYSTOLIC BLOOD PRESSURE: 126 MMHG | HEART RATE: 91 BPM | DIASTOLIC BLOOD PRESSURE: 75 MMHG | WEIGHT: 214.5 LBS | HEIGHT: 66 IN

## 2024-05-02 DIAGNOSIS — O99.342 ANXIETY DURING PREGNANCY IN SECOND TRIMESTER, ANTEPARTUM: ICD-10-CM

## 2024-05-02 DIAGNOSIS — F32.A DEPRESSION AFFECTING PREGNANCY IN SECOND TRIMESTER, ANTEPARTUM: Primary | ICD-10-CM

## 2024-05-02 DIAGNOSIS — F41.9 ANXIETY DURING PREGNANCY IN SECOND TRIMESTER, ANTEPARTUM: ICD-10-CM

## 2024-05-02 DIAGNOSIS — O99.342 DEPRESSION AFFECTING PREGNANCY IN SECOND TRIMESTER, ANTEPARTUM: Primary | ICD-10-CM

## 2024-05-02 NOTE — TREATMENT PLAN
Multi-Disciplinary Problems (from Behavioral Health Treatment Plan)      Active Problems       Problem: Anxiety  Start Date: 05/02/24      Problem Details: The patient self-scales this problem as a 2 with 10 being the worst.          Goal Priority Start Date Expected End Date End Date    Patient will develop and implement behavioral and cognitive strategies to reduce anxiety and irrational fears. -- 05/02/24 -- --    Goal Details: Progress toward goal:  The patient self-scales their progress related to this goal as a 2 with 10 being the worst.          Goal Intervention Frequency Start Date End Date    Help patient explore past emotional issues in relation to present anxiety. Weekly 05/02/24 --    Intervention Details: Duration of treatment until until remission of symptoms.          Goal Intervention Frequency Start Date End Date    Help patient develop an awareness of their cognitive and physical responses to anxiety. Weekly 05/02/24 --    Intervention Details: Duration of treatment until until remission of symptoms.                          Reviewed By       Padmini Maldonado APRN 05/02/24 0704                     I have discussed and reviewed this treatment plan with the patient.  It has been printed for signatures.

## 2024-05-02 NOTE — PROGRESS NOTES
"[facility]  Initial Evaluation    Referring Provider:  Melva Ryan, DO  [address] [place] [address]    Chief Complaint: \"I am interested in therapy\"    History of Present Illness: Jaimie Dean is a 29 y.o. female who presents today for initial evaluation regarding psychotherapy for anxiety during pregnancy.. Jaimie presents unaccompanied in no acute distress and engages with me appropriately. Psychotropic regimen with which patient presents is described as sertraline.   Jaimie is in her second trimester of her pregnancy, she endorses a previous history of MDD, and DIANA. She has a full time career as a  here in [place]. She is a , a wife, and mother of a 3 year old boy. Jaimie suffered a pregnancy loss in the recent past, and was keen to returning on sertraline. Her PCP and OB provider encouraged her to stay on the medication during her current pregnancy. She reports no side effects but desires to stay on the current dose. Her PCP recommended talk therapy.     History is positive for signs/symptoms suggestive of low mood, low energy, anhedonia, changes in sleep, changes in appetite, guilt, poor concentration, psychomotor changes, denies thoughts of being better off dead. Jaimie also endorses having a history of consistent and excessive worry across several domains of life that contributes to tension and irritability throughout the day.     Psychiatric screening is negative for pathognomonic history of: TBI, PTSD,     I have counseled the patient with regard to diagnoses and the recommended treatment regimen as documented below: I will assume prescriptive responsibility for sertraline 100 mg po q am (if PCP is agreeable). Patient acknowledges the diagnoses per my rendered interpretation. Patient demonstrates awareness/understanding of viable alternatives for treatment as well as potential risks, benefits, and side effects " associated with this regimen and is amenable to proceed in this fashion.     Recommended lifestyle changes: 30 minutes of activity to increase HR 2-3 days weekly.    Psychiatric History:  Diagnoses: anxiety and depression  Outpatient history: therapy  Inpatient history: none  Medication trials: sertraline 100 mg   Other treatment modalities: none  Self harm: none  Suicide attempts: No  Abuse or neglect: none    Substance Abuse History:   Types/methods/frequency: *none  Transtheoretical stage: none    Social History:  Residence: lives house,  and toddler, cat  Vocation: yes, advisor at [employer]  Source of income: Employed  Last grade completed: college  Pertinent developmental history: none  Pertinent legal history: No history   Hobbies/interests: binge watch television, play outside with toddler  Baptist:spiritual  Exercise:walking 10 minutes/day  5 days/week   Dietary habits: no pertinent issues   Sleep hygiene: no pertinent issues   Social habits: no pertinent issues   Sunlight: There are no concern for under-exposure.  Caffeine intake: no pertinent issues   Hydration habits: no pertinent issues    history: No    Social History     Socioeconomic History    Marital status:    Tobacco Use    Smoking status: Never     Passive exposure: Never    Smokeless tobacco: Never   Vaping Use    Vaping status: Never Used   Substance and Sexual Activity    Alcohol use: Not Currently     Comment: occ    Drug use: Never    Sexual activity: Yes     Partners: Male     Birth control/protection: None     Access to Firearms: no    Tobacco use counseling/intervention: N/A, patient does not use tobacco  PHQ-9 Depression Screening  PHQ-9 Total Score: 18    Little interest or pleasure in doing things? 2-->more than half the days   Feeling down, depressed, or hopeless? 2-->more than half the days   Trouble falling or staying asleep, or sleeping too much? 3-->nearly every day   Feeling tired or having little energy?  3-->nearly every day   Poor appetite or overeating? 3-->nearly every day   Feeling bad about yourself - or that you are a failure or have let yourself or your family down? 2-->more than half the days   Trouble concentrating on things, such as reading the newspaper or watching television? 3-->nearly every day   Moving or speaking so slowly that other people could have noticed? Or the opposite - being so fidgety or restless that you have been moving around a lot more than usual? 0-->not at all   Thoughts that you would be better off dead, or of hurting yourself in some way? 0-->not at all   PHQ-9 Total Score 18     DIANA-7  Feeling nervous, anxious or on edge: More than half the days  Not being able to stop or control worrying: Nearly every day  Worrying too much about different things: Nearly every day  Trouble Relaxing: Nearly every day  Being so restless that it is hard to sit still: Not at all  Feeling afraid as if something awful might happen: Nearly every day  Becoming easily annoyed or irritable: Nearly every day  DIANA 7 Total Score: 17  If you checked any problems, how difficult have these problems made it for you to do your work, take care of things at home, or get along with other people: Somewhat difficult    Problem List:  Patient Active Problem List   Diagnosis    Supervision of other normal pregnancy, antepartum    Depression     Allergy:   Allergies   Allergen Reactions    Amoxicillin Rash        Discontinued Medications:  There are no discontinued medications.    Current Medications:   Current Outpatient Medications   Medication Sig Dispense Refill    prenatal vitamin (prenatal, CLASSIC, vitamin) tablet Take  by mouth Daily.      sertraline (ZOLOFT) 100 MG tablet TAKE 1 TABLET BY MOUTH EVERY DAY 30 tablet 1     No current facility-administered medications for this visit.     Past Medical History:  Past Medical History:   Diagnosis Date    Anxiety     Class 2 severe obesity with serious comorbidity and  "body mass index (BMI) of 35.0 to 35.9 in adult 07/05/2023    Depression 01/31/2024     Past Surgical History:  History reviewed. No pertinent surgical history.  Family History:   Family History   Problem Relation Age of Onset    Anxiety disorder Mother     No Known Problems Father     No Known Problems Sister     ADD / ADHD Brother     No Known Problems Maternal Aunt     No Known Problems Paternal Aunt     No Known Problems Maternal Uncle     No Known Problems Paternal Uncle     No Known Problems Maternal Grandfather     Alcohol abuse Maternal Grandmother     Uterine cancer Maternal Grandmother     Lung cancer Paternal Grandfather     No Known Problems Paternal Grandmother     No Known Problems Cousin     No Known Problems Other     Dementia Paternal Great-Grandmother     Breast cancer Neg Hx     Ovarian cancer Neg Hx     Deep vein thrombosis Neg Hx     Pulmonary embolism Neg Hx     Bipolar disorder Neg Hx     Depression Neg Hx     Drug abuse Neg Hx     OCD Neg Hx     Paranoid behavior Neg Hx     Schizophrenia Neg Hx     Seizures Neg Hx     Self-Injurious Behavior  Neg Hx     Suicide Attempts Neg Hx        Mental Status Exam:   Observations:  Appearance: Neat  Speech: Normal  Eye Contact: Normal  Motor Activity: Normal  Affect:Full  Comments:  Mood:Mood: Euthymic  Cognition  Orientation Impairment: None  Memory Impairment: None  Attention: Normal  Comments:  Perception  Hallucinations:None  Other:   Comments:  Thoughts:  Suicidality:None  Homicidality:None  Delusions:  None  Comments:  Behavior:Behavior: Cooperative  Insight: Insight: Good  Judgement:Insight: Good    Vital Signs:   /75   Pulse 91   Ht 167.6 cm (66\")   Wt 97.3 kg (214 lb 8 oz)   BMI 34.62 kg/m²    Lab Results:   Routine Prenatal on 04/29/2024   Component Date Value Ref Range Status    Glucose, UA 04/29/2024 Negative  Negative mg/dL Final    Protein, POC 04/29/2024 Negative  Negative mg/dL Final    WBC 04/29/2024 8.57  3.40 - 10.80 10*3/mm3 " Final    RBC 04/29/2024 3.60 (L)  3.77 - 5.28 10*6/mm3 Final    Hemoglobin 04/29/2024 11.5 (L)  12.0 - 15.9 g/dL Final    Hematocrit 04/29/2024 34.4  34.0 - 46.6 % Final    MCV 04/29/2024 95.6  79.0 - 97.0 fL Final    MCH 04/29/2024 31.9  26.6 - 33.0 pg Final    MCHC 04/29/2024 33.4  31.5 - 35.7 g/dL Final    RDW 04/29/2024 12.7  12.3 - 15.4 % Final    RDW-SD 04/29/2024 43.5  37.0 - 54.0 fl Final    MPV 04/29/2024 11.9  6.0 - 12.0 fL Final    Platelets 04/29/2024 183  140 - 450 10*3/mm3 Final    Glucose Gestational Screen 04/29/2024 114  65 - 139 mg/dL Final   Routine Prenatal on 04/03/2024   Component Date Value Ref Range Status    Glucose, UA 04/03/2024 2+ (A)  Negative mg/dL Final    Protein, POC 04/03/2024 Negative  Negative mg/dL Final   Routine Prenatal on 02/28/2024   Component Date Value Ref Range Status    Glucose, UA 02/28/2024 Trace (A)  Negative mg/dL Final    Protein, POC 02/28/2024 Negative  Negative mg/dL Final   Initial Prenatal on 01/31/2024   Component Date Value Ref Range Status    Glucose, UA 01/31/2024 Negative  Negative mg/dL Final    Protein, POC 01/31/2024 Negative  Negative mg/dL Final    Hgb F Quant 01/31/2024 0.0  0.0 - 2.0 % Final    Hgb A 01/31/2024 97.5  96.4 - 98.8 % Final    Hgb A2 Quant 01/31/2024 2.5  1.8 - 3.2 % Final    Hgb S 01/31/2024 0.0  0.0 % Final    Hgb Interp. 01/31/2024 Comment   Final    Normal hemoglobin present; no hemoglobin variant or beta thalassemia  identified.  Note: Alpha thalassemia may not be detected by the Hgb Fractionation  Cascade panel. If alpha thalassemia is suspected, [facility] offers  Alpha-Thalassemia DNA Analysis (#[id number]).    Hemoglobin A1C 01/31/2024 4.80  4.80 - 5.60 % Final    Urine Culture 01/31/2024 50,000 CFU/mL Normal Urogenital Bonny   Final    Amphet/Methamphet, Screen 01/31/2024 Negative  Negative Final    Barbiturates Screen, Urine 01/31/2024 Negative  Negative Final    Benzodiazepine Screen, Urine 01/31/2024 Negative  Negative Final     Cocaine Screen, Urine 01/31/2024 Negative  Negative Final    Opiate Screen 01/31/2024 Negative  Negative Final    THC, Screen, Urine 01/31/2024 Negative  Negative Final    Methadone Screen, Urine 01/31/2024 Negative  Negative Final    Oxycodone Screen, Urine 01/31/2024 Negative  Negative Final    Fentanyl, Urine 01/31/2024 Negative  Negative Final    Genes 01/31/2024 Comment   Final    2 genes    Ethnicity 01/31/2024 Comment   Final    Not Provided    Specimen Type 01/31/2024 Comment   Final    Whole Blood    Indication: 01/31/2024 Comment   Final    Carrier Test / Screening    Result 01/31/2024 Comment   Final    NEGATIVE    Interpretation 01/31/2024 Comment   Final    Negative Results  Disorders (Gene)   Result           Interpretation  Cystic fibrosis    NEGATIVE         This result reduces,  (CFTR)                              but does not  NM_000492.4                         eliminate, the risk                                      to be a carrier.                                      Risk: NOT at an                                      increased risk for                                      an affected                                      pregnancy.  Spinal muscular    NEGATIVE : 2     This result reduces,  atrophy (SMN1)     copies of        but does not  NM_[id number]        SMN1;            eliminate, the risk                     c.*3+80T>G       to be a carrier.                     risk variant     Risk: NOT at an                     not present.     increased risk for                                      an affected                                      pregnancy.    RECOMMENDATIONS 01/31/2024 Comment   Final    Genetic counseling is recommended to discuss the potential  clinical and/or reproductive implications of positive  results, as well as recommendations for testing family  members and, when applicable, this individual's partner.  Genetic counseling services are available. To access  [facility] please visit  [email or web address] or call  [phone] ([phone]).    Comments: 01/31/2024 Comment   Final    This interpretation is based on the clinical information  provided and the current understanding of the molecular  genetics of the disorder(s) tested. Information about the  disorder(s) tested is available at  [email or web address].    METHODS AND LIMITATIONS  01/31/2024 Comment   Final    Comment: Next-generation Sequencing: Genomic regions of interest are  selected using the [employer] hybridization  capture method and sequenced via the [employer] next  generation sequencing platform. Sequencing reads are  aligned with the human genome reference GRCh37/hg19 build.  Regions of interest include coding exons, intron/exon  junctions (+/- 20 nucleotides) and additional genomic  regions with known significant pathogenic variants.  Analytical sensitivity at 30X coverage is estimated to be  >99% for single nucleotide variants, >99% for  insertions/deletions less than six base pairs and >96% for  insertions/deletions between six and forty-five base pairs.  Variant detection is performed by [employer] and  in-house algorithms. Expected minimum size resolution for  CNVs in CFTR is 60 bp of coding sequence. Precise  breakpoints are not reported. Single-exon deletions or  duplications are not detected in some cases due to CNV size  limitations, or due to isolated data                            quality variation or  intrinsic sequence properties. Confirmatory testing by  orthogonal technologies includes Joselin sequencing, MLPA  analysis.  Reported variants: Pathogenic and likely pathogenic  variants are reported for all tests. Benign and likely  benign variants are typically not reported. Variants of  uncertain significance are reported when included in the  test specification. Variants are specified using the  numbering and nomenclature  recommended by the Human Genome  Variation Society (HGVS, http://www.hgvs.org/). Variant  classification and confirmation are consistent with ACMG  standards and guidelines ([place], PMID:[id number]; Terry,  PMID:[id number]). Detailed variant classification information  and reevaluation are available upon request.  Spinal muscular atrophy: The copy number of SMN1 exon 7 is  assessed relative to internal standard reference genes by  quantitative polymerase chain reaction (qPCR). A  mathematical algorithm calculates 0, 1, 2 and 3 copies                            with  statistical confidence. In fetal specimens and specimens  with 0 or 1 copies, the primer and probe binding sites are  sequenced to rule out variants that could interfere with  copy number analysis. SMN2 copy number is assessed by  digital droplet PCR analysis relative to an internal  standard reference gene in samples with no copies of SMN1.  For carrier screening, when two copies of SMN1 are  detected, allelic discrimination qPCR targeting c.*3+80T>G  in SMN1 is performed.  Limitations: Technologies used do not detect germline  mosaicism and do not rule out the presence of large  chromosomal aberrations including rearrangements and gene  fusions, or variants in regions or genes not included in  this test, or possible inter/intragenic interactions  between variants, or repeat expansions. Variant  classification and/or interpretation may change over time  if more information becomes available. False positive or  false negative results may occur for reasons that include:  rare                            genetic variants, sex chromosome abnormalities,  pseudogene interference, blood transfusions, bone marrow  transplantation, somatic or tissue-specific mosaicism,  mislabeled samples, or erroneous representation of family  relationships.  This test was developed and its performance characteristics  determined by [facility]. It has  not  been cleared or approved by the Food and Drug  Administration.  [employer] is a subsidiary of  [employer], using the brand  [facility]. Inheritest(R) and GeneSeq(R) are registered  service marks of [employer].    References 01/31/2024 Comment   Final    Sen REYES, Sharron M, Ciro S et al. Screening for  autosomal recessive and X-linked conditions during  pregnancy and preconception: a practice resource of the  American College of Medical Genetics and Genomics (ACMG).  Aurea Med 23, 9218 (2021). PMID: [id number]    Disorders Tested 01/31/2024 Comment   Final    Cystic fibrosis (1 gene). Autosomal recessive: CFTR  Spinal muscular atrophy (1 gene). Autosomal recessive: SMN1    Director Review/Release 01/31/2024 Comment   Final    Component Type      Performed At         Laboratory                                           Director  Technical           [facility]     Melva Strange, PhD,  [employer]Atlas [employer],   [facility]  processing          [facility]                      [place], [place],                      [address]  Technical           [facility]     Melva Strange, PhD,  component,          [employer],   FAC  analysis            [address]                      [facility],                      [place], [place],                      [id number]  Professional        [facility]     Melva Strange, PhD,  [employer],   [address],                      [id number]  Electronically released by Ciera Hughes, PhD, [facility]    Gestation 01/31/2024 Taveras   Final    Fetal Fraction 01/31/2024 7%   Final    Gestational Age >9: 01/31/2024 Yes   Final    Result 01/31/2024 Negative   Final     Comments 01/31/2024 Comment   Final    This specimen showed an expected representation of  chromosome  21, 18 and 13 material. Clinical correlation is  suggested.    Approved By 01/31/2024 Comment   Final    Adonis Sepulveda MD, PhD, Director, [facility]    TRISOMY 21 (DOWN SYNDROME) 01/31/2024 Negative   Final    TRISOMY 18 (KUO SYNDROME) 01/31/2024 Negative   Final    TRISOMY 13 (PATAU SYNDROME) 01/31/2024 Negative   Final    FETAL SEX 01/31/2024 Comment   Final    Consistent with Female    MONOSOMY X (REILLY SYNDROME) 01/31/2024 Not Detected   Final    XYY (LAWSON SYNDROME) 01/31/2024 Not Detected   Final    XXY (KLINEFELTER SYNDROME) 01/31/2024 Not Detected   Final    XXX (TRIPLE X SYNDROME) 01/31/2024 Not Detected   Final    22Q11 DELETION (DIGEORGE) 01/31/2024 Not Detected   Final    15Q11 DELETION (PW ANGELMAN) 01/31/2024 Not Detected   Final    11Q23 DELETION (PING) 01/31/2024 Not Detected   Final    8Q24 DELETION (FARTUN-GIEDION) 01/31/2024 Not Detected   Final    5P15 DELETION (Cri-du-chat) 01/31/2024 Not Detected   Final    4P16 DELETION (MORRISON-HIRSCHHORN) 01/31/2024 Not Detected   Final    1P36 DELETION SYNDROME 01/31/2024 Not Detected   Final    TRIOSOMY 16 01/31/2024 Not Detected   Final    TRISOMY 22 01/31/2024 Not Detected   Final    NEGATIVE PREDICTIVE VALUE 01/31/2024 Note   Final    The Negative Predictive Value (NPV) for trisomy 21, 18, and  13 is greater than 99%. The NPV for SCA and ESS cannot be  calculated as SCA and ESS are only reported when an  abnormality is detected.    POSITIVE PREDICTIVE VALUE 01/31/2024 N/A   Final    About The Test 01/31/2024 Comment   Final    The MaterniT(R) 21 PLUS laboratory-developed test (LDT)  analyzes circulating cell-free DNA from a maternal blood  sample. This test is used for screening purposes and not  diagnostic. Clinical correlation is recommended. Validation  data on twin pregnancies is limited and the ability of this  test to detect aneuploidy in higher multiple gestations has  not yet been validated.    Test Method 01/31/2024  Comment   Final    See Notes  Circulating cell-free DNA was purified from the plasma  component of maternal blood. The extracted DNA was then  converted into a genomic DNA library for aneuploidy  analysis of chromosomes 21, 18, and 13 via next generation  sequencing.[1] Optional findings based on the test order  include sex chromosome aneuploidy (SCA)[2], and enhanced  sequencing series (ESS)[3], which will only be reported on  as an additional finding when an abnormality is detected.  SCA testing includes information on X and Y representation,  while ESS testing includes deletions in selected regions  (22q, 15q, 11q, 8q, 5p, 4p, 1p) and trisomy of chromosomes  16 and 22.    Performance 01/31/2024 Comment   Final    The performance characteristics of the MaterniT(R) 21 PLUS  laboratory-developed test (LDT) have been determined in a  clinical validation study with pregnant women at increased  risk for fetal chromosomal aneuploidy.[1-4]    PERFORMANCE CHARACTERISTICS 01/31/2024 Note   Final    Comment: -----------------------------------------------------------  ! Fetal Sex                      ! Accuracy: 99.4%        !  !---------------------------------------------------------!  ! Region (associated syndrome)   ! Est. Sens# ! Est. Spec !  !---------------------------------------------------------!  ! Trisomy 21 (Down Syndrome)     ! 99.1%      ! 99.9%     !  !---------------------------------------------------------!  ! Trisomy 18 (Rahman Syndrome)  ! >99.9%     ! 99.6%     !  !---------------------------------------------------------!  ! Trisomy 13 (Patau Syndrome)    ! 91.7%      ! 99.7%     !  !---------------------------------------------------------!  ! Sex Chromosome Aneuploidies##  ! 96.2%      ! 99.7%     !  !---------------------------------------------------------!  * As reported in [facility] database nstd37  [https://www.ncbi.nlm.nih.gov/dbvar/studies/nstd37/ ]  # Estimated Sensitivity. Sensitivity estimated  across the  observed size distribution of each syndrome [per                            ISCA  database nstd37] and across the range of fetal fractions  observed in routine clinical NIPT. Actual sensitivity can  also be influenced by other factors such as the size of the  event, total sequence counts, amplification bias, or  sequence bias.  ## Taveras gestation only.    Limitations of the Test 01/31/2024 Comment   Final    Comment: While the results of these tests are highly reliable,  discordant results, including inaccurate fetal sex  prediction, may occur due to placental, maternal, or fetal  mosaicism or neoplasm; vanishing twin; prior maternal organ  transplant; or other causes. These tests are screening  tests and not diagnostic; they do not replace the accuracy  and precision of prenatal diagnosis with CVS or  amniocentesis. A patient with a positive test result should  be referred for genetic counseling and offered invasive  prenatal diagnosis for confirmation of test results.[5] The  results of this testing, including the benefits and  limitations, should be discussed with a qualified  healthcare provider. Pregnancy management decisions,  including termination of the pregnancy, should not be based  on the results of these tests alone. The healthcare  provider is responsible for the use of this information in  the management of their patient.  Sex chromosomal  aneuploidies are not reportable for known                            multiple  gestations. A negative result does not ensure an unaffected  pregnancy nor does it exclude the possibility of other  chromosomal abnormalities or birth defects which are not a  part of these tests. An uninformative result may be  reported, the causes of which may include, but are not  limited to, insufficient sequencing coverage, noise or  artifacts in the region, amplification or sequencing bias,  or insufficient fetal fraction. These tests are not  intended to identify  pregnancies at risk for neural tube  defects or ventral wall defects. Testing for whole  chromosome abnormalities (including sex chromosomes) and  for subchromosomal abnormalities could lead to the  potential discovery of both fetal and maternal genomic  abnormalities that could have major, minor, or no, clinical  significance. Evaluating the significance of a positive or  a non-reportable result may involve both invasive testing  and additional studies on the mother. Such investigations  may lead to a                            diagnosis of maternal chromosomal or  subchromosomal abnormalities, which on occasion may be  associated with benign or malignant maternal neoplasms.  These tests may not accurately identify fetal triploidy,  balanced rearrangements, or the precise location of  subchromosomal duplications or deletions; these may be  detected by prenatal diagnosis with CVS or amniocentesis.  The ability to report results may be impacted by maternal  BMI, maternal weight, maternal systemic lupus erythematosus  (SLE) and/or by certain pharmaceutical agents such as low  molecular weight heparin (for example: Lovenox(R),  Xaparin(R), Clexane(R) and Fragmin(R)).    Note 01/31/2024 Comment   Final    See Notes  [employer] is a subsidiary of [employer], using the brand [employer]. This test was  developed and its performance characteristics determined by  [employer]. It has not been cleared or approved by the Food  and Drug Administration. This laboratory is certified under  the Clinical Laboratory Improvement Amendments (CLIA) as  qualified to perform high complexity clinical laboratory  testing and accredited by the College of American  Pathologists (CAP).  If there is future clinical need for adding [facility]  testing, this specimen will be available until term.  [place] samples will not be retained beyond 60 days.  [facility] patients will have to send a new sample  for  re-sequencing (LCA Test Code: 347768).    References 01/31/2024 Comment   Final    1. Nora FINNEGAN, et al. Aurea Med. 2012;14(3):296-305.  2. Teja REYES, et al. Prenat Diag. 2013;33(6):591-597.  3. Tony NEWBY et al. Clin Chem. 2015 Apr;61(4):608-616.  4. Nora FINNEGAN, et al. Aurea Med. 2011;13(11):913-920.  5. ACOG/SMFM Practice Bulletin No. 226, Oct 2020.    WBC 01/31/2024 7.04  3.40 - 10.80 10*3/mm3 Final    RBC 01/31/2024 4.14  3.77 - 5.28 10*6/mm3 Final    Hemoglobin 01/31/2024 13.3  12.0 - 15.9 g/dL Final    Hematocrit 01/31/2024 39.0  34.0 - 46.6 % Final    MCV 01/31/2024 94.2  79.0 - 97.0 fL Final    MCH 01/31/2024 32.1  26.6 - 33.0 pg Final    MCHC 01/31/2024 34.1  31.5 - 35.7 g/dL Final    RDW 01/31/2024 12.8  12.3 - 15.4 % Final    RDW-SD 01/31/2024 43.8  37.0 - 54.0 fl Final    MPV 01/31/2024 11.9  6.0 - 12.0 fL Final    Platelets 01/31/2024 246  140 - 450 10*3/mm3 Final    Neutrophil % 01/31/2024 69.0  42.7 - 76.0 % Final    Lymphocyte % 01/31/2024 20.6  19.6 - 45.3 % Final    Monocyte % 01/31/2024 9.1  5.0 - 12.0 % Final    Eosinophil % 01/31/2024 0.9  0.3 - 6.2 % Final    Basophil % 01/31/2024 0.1  0.0 - 1.5 % Final    Immature Grans % 01/31/2024 0.3  0.0 - 0.5 % Final    Neutrophils, Absolute 01/31/2024 4.86  1.70 - 7.00 10*3/mm3 Final    Lymphocytes, Absolute 01/31/2024 1.45  0.70 - 3.10 10*3/mm3 Final    Monocytes, Absolute 01/31/2024 0.64  0.10 - 0.90 10*3/mm3 Final    Eosinophils, Absolute 01/31/2024 0.06  0.00 - 0.40 10*3/mm3 Final    Basophils, Absolute 01/31/2024 0.01  0.00 - 0.20 10*3/mm3 Final    Immature Grans, Absolute 01/31/2024 0.02  0.00 - 0.05 10*3/mm3 Final    nRBC 01/31/2024 0.0  0.0 - 0.2 /100 WBC Final    Hepatitis B Surface Ag 01/31/2024 Non-Reactive  Non-Reactive Final    Rubella Antibodies, IgG 01/31/2024 2.84  Immune >0.99 index Final                                    Non-immune       <0.90                                  Equivocal  0.90 - 0.99                                   Immune           >0.99    ABO Type 01/31/2024 A   Final    RH type 01/31/2024 Positive   Final    Antibody Screen 01/31/2024 Negative   Final    Hepatitis C Ab 01/31/2024 Non-Reactive  Non-Reactive Final    Treponemal AB Total 01/31/2024 Non-Reactive  Non-Reactive Final    Chlamydia trachomatis, GUCCI 01/31/2024 Negative  Negative Final    Neisseria gonorrhoeae, GUCCI 01/31/2024 Negative  Negative Final    Trichomonas vaginosis 01/31/2024 Negative  Negative Final   Lab on 12/15/2023   Component Date Value Ref Range Status    HCG Quantitative 12/15/2023 8,142.00  mIU/mL Final       ASSESSMENT AND PLAN:    ICD-10-CM ICD-9-CM   1. Depression affecting pregnancy in second trimester, antepartum  O99.342 648.43    F32.A 311   2. Anxiety during pregnancy in second trimester, antepartum  O99.342 648.43    F41.9 300.00       Jaimie who presents today for initial evaluation regarding referral to psychotherapy . We have discussed the history and interpreted diagnoses as above as well as the treatment plan below, including potential R/B/SE of the recommended regimen of which the patient demonstrates understanding. Patient is agreeable to call 911 or go to the nearest ER should she become concerned for her own safety and/or the safety of those around her. There are not overt indices of acute ben/psychosis on evaluation today.     Medication regimen: CONTINUE sertraline 100 mg po q day; patient is advised not to misuse prescribed medications or to use any exogenous substances that aren't disclosed to this provider as they may interact with the regimen to her detriment.   Risk Assessment: protracted risk is moderate, imminent risk is low.  Risk factors include: anxiety disorder, mood disorder, and recent/ongoing psychosocial stressors. Protective factors include: no known family history of suicidality, intact reality testing, no substance use disorder, no access to firearms, no present SI, no stated history of suicide attempts or  self-harm, patient's exhibited future-orientation, strong social support, and patient's cooperation with care. Do note that this is subject to change with the Jehovah's witness of new stressors, treatment non-adherence, use of substances, and/or new medical ails.  Monitoring: reviewed labs/imaging as populated above, PHQ-9 today is PHQ-9 Total Score: 18 /27, DIANA-7 today is 17/21  Therapy: Melva Menendez  Follow-up: one month  Communications: N/A    TREATMENT PLAN/GOALS: challenge patterns of living conducive to symptom burden, implement recommended regimen as above with augmentative, intermittent supportive psychotherapy to reduce symptom burden. Patient acknowledged and verbally consented to begin treatment as above. The importance of adherence to the recommended treatment and interval follow-up appointments was emphasized today. Patient was today advised to limit daily caffeine intake, hydrate appropriately, eat healthy and nutritious foods, engage sleep hygiene measures, engage appropriate exposure to sunlight, engage with hobbies in balance with life necessities, and exercise appropriate to their capacity to do so.     Billing: I have seen the patient today and considered her psychiatric complaints, rendered a diagnosis, and discussed treatment with the patient as above with which she consents.    Parts of this note are electronic transcriptions/translations of spoken language to printed text using the Dragon Dictation system.    Electronically signed by MICHAEL Galvan, 05/02/24,

## 2024-05-21 NOTE — PROGRESS NOTES
OB FOLLOW UP      Chief Complaint   Patient presents with    Routine Prenatal Visit     Nausea, headaches, out of breath, spurt of rapid movement      Subjective:   Nausea, better w eating breakfast.  No vomiting.  No F/C.  No URI sxs.    Rapid FM in evening at times, lasts only 30sec  Occas HA, resolved spontaneously.  Hx of HA prepreg.   Easily winded,  walking up stairs.  No CP. Resolves spont.     Objective:  /83   Wt 99.3 kg (219 lb)   LMP 11/09/2023   BMI 35.35 kg/m²  7.258 kg (16 lb) Facility age limit for growth %nadia is 20 years.  See OB flow sheet for fundal height (not performed if US day of OV), FHT, edema, cvx exam if performed, and Upro/Uglu  Chaperone present during pelvic exam if performed.      Assessment and Plan:  28w5d     Diagnoses and all orders for this visit:    1. Supervision of other normal pregnancy, antepartum (Primary)  Overview:  TERESA finalized: 8/15/24 based on LMP, c/w 8week US    Genetic testing (NIPS-Quad)/CF/AFP: NIPS neg, XX, CF/SMA-neg    COVID: Recommended  Flu: Vaccinated  Tdap: Recommended, s/p Rx     TPA 28-32weeks: 5/28/2024   Sterilization:  Declined    Anatomy US: 4/3/24 efw 73%, AC 65%, consistent with dating, All anatomy seen and WNL, Posterior placenta-grade 1, Female, Cephalic   FU US:    EPDS: 6, new OB visit   GTT: 114    PROBLEM LIST/PLAN:   Depression/Anxiety: Zoloft 100mg daily - continue.  Stable  4/29/2024 referral for therapy  5/28/2024 has seen provider Padmini Maldonado to manage meds, but did not get  referral and has FU next week Padmini.  Cont 100mg zoloft.     Orders:  -     POC Urinalysis Dipstick  -     RPR, Rfx Qn RPR / Confirm TP  -     Ambulatory Referral to Behavioral Health      Counseling:    OB precautions, leaking, VB, jerrell cox vs PTL/Labor  FK  Suspect shortness of air secondary to advanced gestation.  If it is associated with chest pain, or persists after rest, needs to be evaluated in the emergency room.  Reassurance re resurgence of  nausea late in pregnancy and burst of FM in evening    Reassuring pregnancy progress. Questions answered.  Continue PNV.  Importance of healthy eating, obtaining sufficient sleep, and staying active unless hypertensive- activity modified as directed.    Return in about 2 weeks (around 6/11/2024) for FU OB q2wks to 36weeks.            Melva Ryan,   05/28/2024    [facility]  [facility] [place] [address]  Dept: [phone]  Dept Fax: [phone]  Loc: [phone]  Loc Fax: [phone]

## 2024-05-28 ENCOUNTER — ROUTINE PRENATAL (OUTPATIENT)
Dept: OBSTETRICS AND GYNECOLOGY | Facility: CLINIC | Age: 29
End: 2024-05-28
Payer: COMMERCIAL

## 2024-05-28 VITALS — DIASTOLIC BLOOD PRESSURE: 83 MMHG | SYSTOLIC BLOOD PRESSURE: 120 MMHG | WEIGHT: 219 LBS | BODY MASS INDEX: 35.35 KG/M2

## 2024-05-28 DIAGNOSIS — Z34.80 SUPERVISION OF OTHER NORMAL PREGNANCY, ANTEPARTUM: Primary | ICD-10-CM

## 2024-05-28 LAB
GLUCOSE UR STRIP-MCNC: NEGATIVE MG/DL
PROT UR STRIP-MCNC: ABNORMAL MG/DL

## 2024-05-28 PROCEDURE — 86592 SYPHILIS TEST NON-TREP QUAL: CPT | Performed by: OBSTETRICS & GYNECOLOGY

## 2024-05-28 PROCEDURE — 0502F SUBSEQUENT PRENATAL CARE: CPT | Performed by: OBSTETRICS & GYNECOLOGY

## 2024-05-30 LAB — RPR SER QL: NON REACTIVE

## 2024-06-10 NOTE — PROGRESS NOTES
Routine Prenatal Visit     Subjective  Jaimie Dean is a 29 y.o.  at 30w5d here for her routine OB visit.   She is taking her prenatal vitamins.Reports no loss of fluid or vaginal bleeding. Patient doing well without any complaints. She does states has been craving grass but hasn't ate any. I will obtain CBC today to check for anemia. Pregnancy complicated by:     Patient Active Problem List   Diagnosis    Supervision of other normal pregnancy, antepartum    Depression         OB History    Para Term  AB Living   3 1 1   1 1   SAB IAB Ectopic Molar Multiple Live Births   1         1      # Outcome Date GA Lbr Joel/2nd Weight Sex Type Anes PTL Lv   3 Current            2 SAB 23 6w0d    SAB      1 Term 21 40w2d   M Vag-Spont  N JEZ           ROS:   General ROS: negative for - chills or fatigue  Respiratory ROS: negative for - cough or hemoptysis  Cardiovascular ROS: negative for - chest pain or dyspnea on exertion  Genito-Urinary ROS: negative for  change in urinary stream, vaginal discharge   Musculoskeletal ROS: negative for - gait disturbance or joint pain  Dermatological ROS: negative for acne,  dry skin or itching    Objective  Physical Exam:   Vitals:    24 1345   BP: 127/84       Uterine Size: size equals dates  FHT: 110-160 BPM    General appearance - alert, well appearing, and in no distress  Mental status - alert, oriented to person, place, and time  Abdomen- Soft, Gravid uterus, non-tender to palpation  Musculoskeletal: negative for - gait disturbance or joint pain  Extremeties: negative swelling or cyanosis   Dermatological: negative rashes or skin lesions       Assessment/Plan:   Diagnoses and all orders for this visit:    1. Supervision of other normal pregnancy, antepartum (Primary)  -     POC Urinalysis Dipstick    2. Other depression  Assessment & Plan:  Zoloft 100mg daily   EPDS 6 at new Ob visit       3. Low iron  -     CBC & Differential; Future  -      CBC & Differential    4. SAB (spontaneous )  -     hCG, Quantitative, Pregnancy            Counseling:   OB precautions, leaking, VB, jerrell cox vs PTL/Labor  FKC  HTN precautions reviewed: HA, vision change, RUQ/epigastric pain, edema  Round Ligament Pain:  The uterus has several ligaments which provide support and keep the uterus in place. As the  uterus grows these ligaments are pulled and stretched which often causes sharp stabbing like pain in the inguinal area.   You may find a pregnancy support band helpful. Changing positions may also help. Yoga is a great way to cope with round ligament and low back pain in pregnancy.    Massage may also help with low back pain   Things to Consider at this Point in your Pregnancy:  Some women experience swelling in their feet during pregnancy. Compression stockings may help  Drink plenty of water and stay active   Make sure you are eating frequent small meals, nuts are a wonderful snack to keep with you            Return in about 2 weeks (around 2024) for Routine OB visit.      We have gone over prenatal care to include the timing and content of visits. I informed her how to contact the office and/or on call person in the event of any problems and encouraged her to do so when she feels it is necessary.  We then spent time answering her questions which she indicated were answered to her satisfaction.    Violette Sands DO  2024 13:46 EDT

## 2024-06-11 ENCOUNTER — ROUTINE PRENATAL (OUTPATIENT)
Dept: OBSTETRICS AND GYNECOLOGY | Facility: CLINIC | Age: 29
End: 2024-06-11
Payer: COMMERCIAL

## 2024-06-11 VITALS — BODY MASS INDEX: 35.83 KG/M2 | SYSTOLIC BLOOD PRESSURE: 127 MMHG | DIASTOLIC BLOOD PRESSURE: 84 MMHG | WEIGHT: 222 LBS

## 2024-06-11 DIAGNOSIS — F32.89 OTHER DEPRESSION: ICD-10-CM

## 2024-06-11 DIAGNOSIS — Z34.80 SUPERVISION OF OTHER NORMAL PREGNANCY, ANTEPARTUM: Primary | ICD-10-CM

## 2024-06-11 DIAGNOSIS — E61.1 LOW IRON: ICD-10-CM

## 2024-06-11 DIAGNOSIS — O03.9 SAB (SPONTANEOUS ABORTION): ICD-10-CM

## 2024-06-11 LAB
BASOPHILS # BLD AUTO: 0.02 10*3/MM3 (ref 0–0.2)
BASOPHILS NFR BLD AUTO: 0.3 % (ref 0–1.5)
DEPRECATED RDW RBC AUTO: 42.3 FL (ref 37–54)
EOSINOPHIL # BLD AUTO: 0.06 10*3/MM3 (ref 0–0.4)
EOSINOPHIL NFR BLD AUTO: 0.8 % (ref 0.3–6.2)
ERYTHROCYTE [DISTWIDTH] IN BLOOD BY AUTOMATED COUNT: 12.6 % (ref 12.3–15.4)
GLUCOSE UR STRIP-MCNC: NEGATIVE MG/DL
HCG INTACT+B SERPL-ACNC: NORMAL MIU/ML
HCT VFR BLD AUTO: 34.5 % (ref 34–46.6)
HGB BLD-MCNC: 11.5 G/DL (ref 12–15.9)
IMM GRANULOCYTES # BLD AUTO: 0.05 10*3/MM3 (ref 0–0.05)
IMM GRANULOCYTES NFR BLD AUTO: 0.6 % (ref 0–0.5)
LYMPHOCYTES # BLD AUTO: 1.34 10*3/MM3 (ref 0.7–3.1)
LYMPHOCYTES NFR BLD AUTO: 16.9 % (ref 19.6–45.3)
MCH RBC QN AUTO: 31.2 PG (ref 26.6–33)
MCHC RBC AUTO-ENTMCNC: 33.3 G/DL (ref 31.5–35.7)
MCV RBC AUTO: 93.5 FL (ref 79–97)
MONOCYTES # BLD AUTO: 0.84 10*3/MM3 (ref 0.1–0.9)
MONOCYTES NFR BLD AUTO: 10.6 % (ref 5–12)
NEUTROPHILS NFR BLD AUTO: 5.62 10*3/MM3 (ref 1.7–7)
NEUTROPHILS NFR BLD AUTO: 70.8 % (ref 42.7–76)
NRBC BLD AUTO-RTO: 0 /100 WBC (ref 0–0.2)
PLATELET # BLD AUTO: 199 10*3/MM3 (ref 140–450)
PMV BLD AUTO: 11.4 FL (ref 6–12)
PROT UR STRIP-MCNC: NEGATIVE MG/DL
RBC # BLD AUTO: 3.69 10*6/MM3 (ref 3.77–5.28)
WBC NRBC COR # BLD AUTO: 7.93 10*3/MM3 (ref 3.4–10.8)

## 2024-06-11 PROCEDURE — 0502F SUBSEQUENT PRENATAL CARE: CPT | Performed by: OBSTETRICS & GYNECOLOGY

## 2024-06-11 PROCEDURE — 85025 COMPLETE CBC W/AUTO DIFF WBC: CPT | Performed by: OBSTETRICS & GYNECOLOGY

## 2024-06-11 PROCEDURE — 84702 CHORIONIC GONADOTROPIN TEST: CPT | Performed by: NURSE PRACTITIONER

## 2024-06-13 DIAGNOSIS — R12 HEARTBURN: ICD-10-CM

## 2024-06-13 DIAGNOSIS — K21.9 MILD ACID REFLUX: ICD-10-CM

## 2024-06-14 RX ORDER — OMEPRAZOLE 20 MG/1
CAPSULE, DELAYED RELEASE ORAL
Qty: 90 CAPSULE | OUTPATIENT
Start: 2024-06-14

## 2024-06-14 NOTE — TELEPHONE ENCOUNTER
I received a refill request for Prilosec 20 mg as a 90 day supply rather than a 30 day supply  The pt is pregnant and her last ob visit was on 06/11/24.  She is scheduled to follow up on 06/25/24 with Dr. Ryan.  I called Jaimie and let her know that I am not able to approve a 90-day supply with this type of medication, but that she can discuss this with Dr. Ryan at her next visit.  The pt understood this information.

## 2024-06-24 NOTE — PROGRESS NOTES
OB FOLLOW UP      Chief Complaint   Patient presents with    Routine Prenatal Visit     Subjective:   Vag discharge mucous only.  No odor or itch.  No cramps or cxns.  No VB.   HA, goes away on it's own or w tylenol.  No RUQ pain or UE edema.    Objective:  /81   Wt 100 kg (221 lb)   LMP 11/09/2023   BMI 35.67 kg/m²  8.165 kg (18 lb) Facility age limit for growth %nadia is 20 years.  See OB flow sheet for fundal height (not performed if US day of OV), FHT, edema, cvx exam if performed, and Upro/Uglu  Chaperone present during pelvic exam if performed.      Assessment and Plan:  32w5d     Diagnoses and all orders for this visit:    1. Supervision of other normal pregnancy, antepartum (Primary)  Overview:  TERESA finalized: 8/15/24 based on LMP, c/w 8week US    Genetic testing (NIPS-Quad)/CF/AFP: NIPS neg, XX, CF/SMA-neg    COVID: Recommended  Flu: Vaccinated  Tdap: Recommended, s/p Rx     TPA 28-32weeks: NEG  Sterilization:  Declined    Anatomy US: 4/3/24 efw 73%, AC 65%, consistent with dating, All anatomy seen and WNL, Posterior placenta-grade 1, Female, Cephalic   FU US:    EPDS: 6, new OB visit   GTT: 114    PROBLEM LIST/PLAN:   Depression/Anxiety: Zoloft 100mg daily - continue.  Stable  4/29/2024 referral for therapy  5/28/2024 has seen provider Padmini Maldonado to manage meds, but did not get  referral and has FU next week Padmini.  Cont 100mg zoloft.  6/25/2024 doing well     Orders:  -     POC Urinalysis Dipstick    2. Elevated blood pressure reading  Overview:  X2, both taken quickly after roomed.  FU wnl.    6/25/2024 wnl FU checks.  Do not suspect GHTN.  Rx BP cuff.  Check qod and FU for S 140 or more OR D 90 or more.  Htn sxs prec given.    Orders:  -     Blood Pressure Device    3. Pica  Comments:  I recommend increasing meat in her diet (especially red meat), iron fortified cereals and cooking in an iron skillet.  Orders:  -     ferrous sulfate 325 (65 FE) MG tablet; Take 1 tablet by mouth Every Other  Day.  Dispense: 15 tablet; Refill: 1    4. Restless leg syndrome in pregnancy  -     ferrous sulfate 325 (65 FE) MG tablet; Take 1 tablet by mouth Every Other Day.  Dispense: 15 tablet; Refill: 1      Counseling:    OB precautions, leaking, VB, jerrell cox vs PTL/Labor  [facility]  HTN precautions reviewed: HA, vision change, RUQ/epigastric pain, edema  RESTLESS LEGS SYNDROME (RLS) reviewed.  Usually resolves after pregnancy.  Familial tendency.  Iron deficiency is a risk factor.  Certain medications can exacerbate it including Reglan, Phenergan, Compazine, antihistamines, SSRIs.  Recommend avoid nicotine and caffeine.  Start exercising daily, massage options and submerging limbs in warm/cold water for 10 minutes before bedtime are conservative options.  Proper sleep hygiene is important.  Option to change any SSRIs to Bupropion and option of an iron supplement reviewed.      Reassuring pregnancy progress. Questions answered.  Continue PNV.  Importance of healthy eating, obtaining sufficient sleep, and staying active unless hypertensive- activity modified as directed.    Return in about 2 weeks (around 7/9/2024) for FU OB q2wks to 36weeks then weekly to delivery.            Melva Ryan, DO  06/25/2024    [facility] [place]  [address] [place] [address]  Dept: [phone]  Dept Fax: [phone]  Loc: [phone]  Loc Fax: [phone]

## 2024-06-25 ENCOUNTER — ROUTINE PRENATAL (OUTPATIENT)
Dept: OBSTETRICS AND GYNECOLOGY | Facility: CLINIC | Age: 29
End: 2024-06-25
Payer: COMMERCIAL

## 2024-06-25 VITALS — WEIGHT: 221 LBS | DIASTOLIC BLOOD PRESSURE: 81 MMHG | SYSTOLIC BLOOD PRESSURE: 113 MMHG | BODY MASS INDEX: 35.67 KG/M2

## 2024-06-25 DIAGNOSIS — F50.89 PICA: ICD-10-CM

## 2024-06-25 DIAGNOSIS — G25.81 RESTLESS LEG SYNDROME IN PREGNANCY: ICD-10-CM

## 2024-06-25 DIAGNOSIS — Z34.80 SUPERVISION OF OTHER NORMAL PREGNANCY, ANTEPARTUM: Primary | ICD-10-CM

## 2024-06-25 DIAGNOSIS — O99.350 RESTLESS LEG SYNDROME IN PREGNANCY: ICD-10-CM

## 2024-06-25 DIAGNOSIS — R03.0 ELEVATED BLOOD PRESSURE READING: ICD-10-CM

## 2024-06-25 LAB
GLUCOSE UR STRIP-MCNC: NEGATIVE MG/DL
PROT UR STRIP-MCNC: NEGATIVE MG/DL

## 2024-06-25 PROCEDURE — 0502F SUBSEQUENT PRENATAL CARE: CPT | Performed by: OBSTETRICS & GYNECOLOGY

## 2024-06-25 RX ORDER — FERROUS SULFATE 325(65) MG
325 TABLET ORAL EVERY OTHER DAY
Qty: 15 TABLET | Refills: 1 | Status: SHIPPED | OUTPATIENT
Start: 2024-06-25

## 2024-06-26 ENCOUNTER — TELEPHONE (OUTPATIENT)
Dept: OBSTETRICS AND GYNECOLOGY | Facility: CLINIC | Age: 29
End: 2024-06-26
Payer: COMMERCIAL

## 2024-07-08 NOTE — PROGRESS NOTES
OB FOLLOW UP      Chief Complaint   Patient presents with    Routine Prenatal Visit     Subjective:   GERD persistent despite prilosec  Denies HA, vision changes/scotomata, RUQ/epigastric pain (hx of nothing new) or UE/facial edema.  Bps at home, systolics 140's, diastolics 80's, occas 90's.    Objective:  /90   Wt 103 kg (226 lb 12.8 oz)   LMP 11/09/2023   BMI 36.61 kg/m²  10.8 kg (23 lb 12.8 oz) Facility age limit for growth %nadia is 20 years.  See OB flow sheet for fundal height (not performed if US day of OV), FHT, edema, cvx exam if performed, and Upro/Uglu  Chaperone present during pelvic exam if performed.      Assessment and Plan:  34w5d     Diagnoses and all orders for this visit:    1. Supervision of other normal pregnancy, antepartum (Primary)  Overview:  TERESA finalized: 8/15/24 based on LMP, c/w 8week US    Genetic testing (NIPS-Quad)/CF/AFP: NIPS neg, XX, CF/SMA-neg    COVID: Recommended  Flu: Vaccinated  Tdap: Recommended, s/p Rx     TPA 28-32weeks: NEG  Sterilization:  Declined    Anatomy US: 4/3/24 efw 73%, AC 65%, consistent with dating, All anatomy seen and WNL, Posterior placenta-grade 1, Female, Cephalic   FU US:    EPDS: 6, new OB visit   GTT: 114    PROBLEM LIST/PLAN:   GHTN- US growth, NSTs weekly- may need to increase to biweekly, del at 37th week  Depression/Anxiety: Zoloft 100mg daily - continue.  Stable  In therapy Padmini Maldonado to manage meds, but did not get  referral and has FU next week Padmini.  Cont 100mg zoloft.     Orders:  -     POC Urinalysis Dipstick    2. Gestational hypertension, third trimester  -     Fetal Nonstress Test; Standing  -     US Ob 14 + Weeks Single or First Gestation; Future  -     Protein / Creatinine Ratio, Urine - Urine, Clean Catch  -     Comprehensive Metabolic Panel  -     CBC (No Diff)    3. Gastroesophageal reflux disease without esophagitis  -     famotidine (Pepcid) 20 MG tablet; Take 1 tablet by mouth 2 (Two) Times a Day As Needed for  Heartburn.  Dispense: 60 tablet; Refill: 1      Counseling:    OB precautions, leaking, VB, jerrell cox vs PTL/Labor  [facility]  HTN precautions reviewed: HA, vision change, RUQ/epigastric pain, edema    Reassuring pregnancy progress. Questions answered.  Continue PNV.  Importance of healthy eating, obtaining sufficient sleep, and staying active unless hypertensive- activity modified as directed.    Return in about 1 week (around 7/16/2024) for FU OB q1wk to TERESA/Delivery, Schedule NSTs weekly starting in the next 3-4days.            Melva Ryan,   07/09/2024    [facility]  [address] [place] [address]  Dept: [phone]  Dept Fax: [phone]  Loc: [phone]  Loc Fax: [phone]

## 2024-07-09 ENCOUNTER — ROUTINE PRENATAL (OUTPATIENT)
Dept: OBSTETRICS AND GYNECOLOGY | Facility: CLINIC | Age: 29
End: 2024-07-09
Payer: COMMERCIAL

## 2024-07-09 VITALS — DIASTOLIC BLOOD PRESSURE: 90 MMHG | SYSTOLIC BLOOD PRESSURE: 142 MMHG | WEIGHT: 226.8 LBS | BODY MASS INDEX: 36.61 KG/M2

## 2024-07-09 DIAGNOSIS — K21.9 GASTROESOPHAGEAL REFLUX DISEASE WITHOUT ESOPHAGITIS: ICD-10-CM

## 2024-07-09 DIAGNOSIS — O13.3 GESTATIONAL HYPERTENSION, THIRD TRIMESTER: ICD-10-CM

## 2024-07-09 DIAGNOSIS — Z34.80 SUPERVISION OF OTHER NORMAL PREGNANCY, ANTEPARTUM: Primary | ICD-10-CM

## 2024-07-09 LAB
ALBUMIN SERPL-MCNC: 3.4 G/DL (ref 3.5–5.2)
ALBUMIN/GLOB SERPL: 1.1 G/DL
ALP SERPL-CCNC: 89 U/L (ref 39–117)
ALT SERPL W P-5'-P-CCNC: 15 U/L (ref 1–33)
ANION GAP SERPL CALCULATED.3IONS-SCNC: 13 MMOL/L (ref 5–15)
AST SERPL-CCNC: 17 U/L (ref 1–32)
BILIRUB SERPL-MCNC: 0.2 MG/DL (ref 0–1.2)
BUN SERPL-MCNC: 7 MG/DL (ref 6–20)
BUN/CREAT SERPL: 16.7 (ref 7–25)
CALCIUM SPEC-SCNC: 9.4 MG/DL (ref 8.6–10.5)
CHLORIDE SERPL-SCNC: 102 MMOL/L (ref 98–107)
CO2 SERPL-SCNC: 21 MMOL/L (ref 22–29)
CREAT SERPL-MCNC: 0.42 MG/DL (ref 0.57–1)
CREAT UR-MCNC: 106.9 MG/DL
DEPRECATED RDW RBC AUTO: 43.6 FL (ref 37–54)
EGFRCR SERPLBLD CKD-EPI 2021: 136 ML/MIN/1.73
ERYTHROCYTE [DISTWIDTH] IN BLOOD BY AUTOMATED COUNT: 13.2 % (ref 12.3–15.4)
GLOBULIN UR ELPH-MCNC: 3.1 GM/DL
GLUCOSE SERPL-MCNC: 88 MG/DL (ref 65–99)
GLUCOSE UR STRIP-MCNC: NEGATIVE MG/DL
HCT VFR BLD AUTO: 36 % (ref 34–46.6)
HGB BLD-MCNC: 11.8 G/DL (ref 12–15.9)
MCH RBC QN AUTO: 30.2 PG (ref 26.6–33)
MCHC RBC AUTO-ENTMCNC: 32.8 G/DL (ref 31.5–35.7)
MCV RBC AUTO: 92.1 FL (ref 79–97)
PLATELET # BLD AUTO: 157 10*3/MM3 (ref 140–450)
PMV BLD AUTO: 12.5 FL (ref 6–12)
POTASSIUM SERPL-SCNC: 4 MMOL/L (ref 3.5–5.2)
PROT ?TM UR-MCNC: 11.5 MG/DL
PROT SERPL-MCNC: 6.5 G/DL (ref 6–8.5)
PROT UR STRIP-MCNC: NEGATIVE MG/DL
PROT/CREAT UR: 0.11 MG/G{CREAT}
RBC # BLD AUTO: 3.91 10*6/MM3 (ref 3.77–5.28)
SODIUM SERPL-SCNC: 136 MMOL/L (ref 136–145)
WBC NRBC COR # BLD AUTO: 6.94 10*3/MM3 (ref 3.4–10.8)

## 2024-07-09 PROCEDURE — 80053 COMPREHEN METABOLIC PANEL: CPT | Performed by: OBSTETRICS & GYNECOLOGY

## 2024-07-09 PROCEDURE — 0502F SUBSEQUENT PRENATAL CARE: CPT | Performed by: OBSTETRICS & GYNECOLOGY

## 2024-07-09 PROCEDURE — 84156 ASSAY OF PROTEIN URINE: CPT | Performed by: OBSTETRICS & GYNECOLOGY

## 2024-07-09 PROCEDURE — 82570 ASSAY OF URINE CREATININE: CPT | Performed by: OBSTETRICS & GYNECOLOGY

## 2024-07-09 PROCEDURE — 85027 COMPLETE CBC AUTOMATED: CPT | Performed by: OBSTETRICS & GYNECOLOGY

## 2024-07-09 RX ORDER — FAMOTIDINE 20 MG/1
20 TABLET, FILM COATED ORAL 2 TIMES DAILY PRN
Qty: 60 TABLET | Refills: 1 | Status: SHIPPED | OUTPATIENT
Start: 2024-07-09

## 2024-07-13 ENCOUNTER — HOSPITAL ENCOUNTER (OUTPATIENT)
Facility: HOSPITAL | Age: 29
Discharge: HOME | End: 2024-07-13
Attending: OBSTETRICS & GYNECOLOGY | Admitting: OBSTETRICS & GYNECOLOGY
Payer: COMMERCIAL

## 2024-07-13 ENCOUNTER — HOSPITAL ENCOUNTER (OUTPATIENT)
Dept: LABOR AND DELIVERY | Facility: HOSPITAL | Age: 29
Discharge: HOME | End: 2024-07-13
Payer: COMMERCIAL

## 2024-07-13 VITALS
RESPIRATION RATE: 18 BRPM | DIASTOLIC BLOOD PRESSURE: 86 MMHG | OXYGEN SATURATION: 98 % | HEART RATE: 95 BPM | SYSTOLIC BLOOD PRESSURE: 139 MMHG

## 2024-07-13 PROCEDURE — G0463 HOSPITAL OUTPT CLINIC VISIT: HCPCS

## 2024-07-13 PROCEDURE — 59025 FETAL NON-STRESS TEST: CPT | Performed by: OBSTETRICS & GYNECOLOGY

## 2024-07-13 PROCEDURE — 59025 FETAL NON-STRESS TEST: CPT

## 2024-07-13 NOTE — SIGNIFICANT NOTE
07/13/24 1442   Nonstress Test   Reason for NST Hypertension  (gestational hypertension, elevated bmi)   Acoustic Stimulator No   Uterine Irritability No   Contractions Irregular   Fetal Assessment   Fetal Movement active   Fetal HR Assessment Method external   Fetal HR (beats/min) 150  (150s)   Fetal HR Baseline normal range   Fetal HR Variability moderate (amplitude range 6 to 25 bpm)   Fetal HR Accelerations episodic;lasting at least 15 seconds;greater than/equal to 15 bpm   Fetal HR Decelerations absent   Sinusoidal Pattern Present absent   Fetal HR Tracing Category Category I   Interpretation A   Nonstress Test Interpretation A Reactive        Patient is in the lateral position.   The body was positioned using the following devices: safety strap and blanket.

## 2024-07-13 NOTE — NON STRESS TEST
Obstetrical Non-stress Test Interpretation     Name:  Jaimie Dean  MRN: [id number]    29 y.o. female  at 35w2d    Indication: NST/GESTATIONAL HYPERTENSION      Fetal Assessment  Fetal Movement: active  Fetal HR Assessment Method: external  Fetal HR (beats/min): 150  Fetal HR Baseline: normal range  Fetal HR Variability: moderate (amplitude range 6 to 25 bpm)  Fetal HR Accelerations: lasting at least 15 seconds  Fetal HR Decelerations: absent    /86 (BP Location: Right arm, Patient Position: Sitting)   Pulse 95   Resp 18   LMP 2023   SpO2 98%     Reason for test: OB Triage (NST/GESTATIONAL HYPERTENSION)  Date of Test: 2024  Time frame of test:   RN NST Interpretation: Reactive      Milli Thomas RN  2024  08:39 EDT

## 2024-07-15 ENCOUNTER — TELEPHONE (OUTPATIENT)
Dept: PSYCHIATRY | Facility: CLINIC | Age: 29
End: 2024-07-15
Payer: COMMERCIAL

## 2024-07-15 NOTE — PROGRESS NOTES
OB FOLLOW UP      Chief Complaint   Patient presents with    Routine Prenatal Visit     Subjective:   No complaints    Objective:  /84   Wt 103 kg (226 lb)   LMP 11/09/2023   BMI 36.48 kg/m²  10.4 kg (23 lb) Body mass index is 36.48 kg/m².  See OB flow sheet for fundal height (not performed if US day of OV), FHT, edema, cvx exam if performed, and Upro/Uglu. Chaperone present during pelvic exam if performed.  GBS RV swab performed today    Assessment and Plan:  35w5d     Diagnoses and all orders for this visit:    1. Supervision of other normal pregnancy, antepartum (Primary)  Overview:  TERESA finalized: 8/15/24 based on LMP, c/w 8week US    Genetic testing (NIPS-Quad)/CF/AFP: NIPS neg, XX, CF/SMA-neg    COVID: Recommended  Flu: Vaccinated  Tdap: Recommended, s/p Rx     TPA 28-32weeks: NEG  Sterilization:  Declined    Anatomy US: 4/3/24 efw 73%, AC 65%, consistent with dating, All anatomy seen and WNL, Posterior placenta-grade 1, Female, Cephalic   FU US: FU ordered 7/9/2024     EPDS: 6, new OB visit   GTT: 114    PROBLEM LIST/PLAN:   GHTN- see separate  Depression/Anxiety: Zoloft 100mg daily - continue.  Stable  In therapy Padmini Maldonado to manage meds, but did not get  referral and has FU next week Padmini.  Cont 100mg zoloft.     Orders:  -     POC Urinalysis Dipstick  -     Group B Strep (Molecular) - Swab, Vaginal/Rectum    2. Gestational hypertension, third trimester  Overview:  US growth  NSTs weekly- q Sat  Del at 37th week   SAT 27Jul 37+2 AP IOL  Check BP at home, strict HTN/pre-e precautions   7/16/2024 BP at home wnl, no elevated BPs    7/9/24 nl labs, UPC 0.11        Counseling:    OB precautions, leaking, VB, jerrell cox vs PTL/Labor  FKC  IOL scheduled      Reassuring pregnancy progress. Questions answered.  Continue PNV.  Importance of healthy eating, obtaining sufficient sleep, and staying active unless hypertensive- activity modified as directed.    Return in about 1 week (around 7/23/2024)  for FU OB q1wk to TERESA/Delivery.            Melva Ryan,   07/16/2024    [facility] [place] [facility]  [address] [place] [address]  Dept: [phone]  Dept Fax: [phone]  Loc: [phone]  Loc Fax: [phone]

## 2024-07-15 NOTE — TELEPHONE ENCOUNTER
Patient was referred out for psychotherapy on 05/02/2024, called patient to follow up on referral order, patient states that this has not been scheduled, and that she does not plan on scheduling therapy at this time, closing out referral order.

## 2024-07-16 ENCOUNTER — ROUTINE PRENATAL (OUTPATIENT)
Dept: OBSTETRICS AND GYNECOLOGY | Facility: CLINIC | Age: 29
End: 2024-07-16
Payer: COMMERCIAL

## 2024-07-16 VITALS — BODY MASS INDEX: 36.48 KG/M2 | SYSTOLIC BLOOD PRESSURE: 125 MMHG | WEIGHT: 226 LBS | DIASTOLIC BLOOD PRESSURE: 84 MMHG

## 2024-07-16 DIAGNOSIS — O13.3 GESTATIONAL HYPERTENSION, THIRD TRIMESTER: ICD-10-CM

## 2024-07-16 DIAGNOSIS — K21.9 MILD ACID REFLUX: ICD-10-CM

## 2024-07-16 DIAGNOSIS — F41.1 GAD (GENERALIZED ANXIETY DISORDER): ICD-10-CM

## 2024-07-16 DIAGNOSIS — F33.0 MILD EPISODE OF RECURRENT MAJOR DEPRESSIVE DISORDER: ICD-10-CM

## 2024-07-16 DIAGNOSIS — Z34.80 SUPERVISION OF OTHER NORMAL PREGNANCY, ANTEPARTUM: Primary | ICD-10-CM

## 2024-07-16 DIAGNOSIS — R12 HEARTBURN: ICD-10-CM

## 2024-07-16 LAB
GLUCOSE UR STRIP-MCNC: NEGATIVE MG/DL
PROT UR STRIP-MCNC: NEGATIVE MG/DL

## 2024-07-16 PROCEDURE — 87653 STREP B DNA AMP PROBE: CPT | Performed by: OBSTETRICS & GYNECOLOGY

## 2024-07-16 RX ORDER — SERTRALINE HYDROCHLORIDE 100 MG/1
100 TABLET, FILM COATED ORAL DAILY
Qty: 30 TABLET | Refills: 0 | Status: SHIPPED | OUTPATIENT
Start: 2024-07-16

## 2024-07-16 RX ORDER — OMEPRAZOLE 20 MG/1
CAPSULE, DELAYED RELEASE ORAL
Qty: 90 CAPSULE | Refills: 0 | Status: SHIPPED | OUTPATIENT
Start: 2024-07-16

## 2024-07-16 NOTE — TELEPHONE ENCOUNTER
Pharmacy requesting refill on Prilosec.  Last seen 6/25/24.  Last filled 6/11/24 Prilosec # 30 with no refills.  Next appointment 7/16/24

## 2024-07-17 LAB — GROUP B STREP, DNA: NEGATIVE

## 2024-07-20 ENCOUNTER — HOSPITAL ENCOUNTER (OUTPATIENT)
Facility: HOSPITAL | Age: 29
Discharge: HOME | End: 2024-07-20
Attending: OBSTETRICS & GYNECOLOGY | Admitting: OBSTETRICS & GYNECOLOGY
Payer: COMMERCIAL

## 2024-07-20 ENCOUNTER — HOSPITAL ENCOUNTER (OUTPATIENT)
Dept: LABOR AND DELIVERY | Facility: HOSPITAL | Age: 29
Discharge: HOME | End: 2024-07-20
Payer: COMMERCIAL

## 2024-07-20 VITALS
SYSTOLIC BLOOD PRESSURE: 132 MMHG | DIASTOLIC BLOOD PRESSURE: 79 MMHG | OXYGEN SATURATION: 99 % | RESPIRATION RATE: 18 BRPM | HEART RATE: 87 BPM

## 2024-07-20 PROCEDURE — 59025 FETAL NON-STRESS TEST: CPT | Performed by: OBSTETRICS & GYNECOLOGY

## 2024-07-20 PROCEDURE — 59025 FETAL NON-STRESS TEST: CPT

## 2024-07-20 NOTE — NON STRESS TEST
Obstetrical Non-stress Test Interpretation     Name:  Jaimie Dean  MRN: [id number]    29 y.o. female  at 36w2d    Indication: Gestational HTN      Fetal Assessment  Fetal Movement: active  Fetal HR Assessment Method: external  Fetal HR (beats/min): 140  Fetal HR Baseline: normal range  Fetal HR Variability: moderate (amplitude range 6 to 25 bpm)  Fetal HR Accelerations: greater than/equal to 15 bpm, lasting at least 15 seconds  Fetal HR Decelerations: absent  Sinusoidal Pattern Present: absent  Fetal HR Tracing Category: Category I    /79 (BP Location: Right arm, Patient Position: Sitting)   Pulse 87   Resp 18   LMP 2023   SpO2 99%     Reason for test: Hypertension (Gestational)  Date of Test: 2024  Time frame of test:   RN NST Interpretation: Martell Howard RN  2024  09:19 EDT

## 2024-07-22 NOTE — PROGRESS NOTES
OB FOLLOW UP      Chief Complaint   Patient presents with    Routine Prenatal Visit     Subjective:   Checking blood pressures at home: 130/80's  Occas pressure HA resolves spontaneously or w tylenol, vision changes/scotomata.  Occas RUQ/epigastric pain no change over entire preg or UE/facial edema    Objective:  /87   Wt 103 kg (226 lb)   LMP 11/09/2023   BMI 36.48 kg/m²  10.4 kg (23 lb) Body mass index is 36.48 kg/m².  See OB flow sheet for fundal height (not performed if US day of OV), FHT, edema, cvx exam if performed, and Upro/Uglu. Chaperone present during pelvic exam if performed.      Assessment and Plan:  37w0d     Diagnoses and all orders for this visit:    1. Supervision of other normal pregnancy, antepartum (Primary)  Overview:  TERESA finalized: 8/15/24 based on LMP, c/w 8week US    NIPS neg, XX, CF/SMA-neg    COVID: Recommended  Flu: Vaccinated  Tdap: Vaccinated    TPA 28-32weeks: NEG  Sterilization:  Declined    Anatomy US: 4/3/24 efw 73%, AC 65%, consistent with dating, All anatomy seen and WNL, Posterior placenta-grade 1, Female, Cephalic   FU US: 7/25/24 [facility] 7#9oz 84%. AC95%.  YANIRA 17, VTX    EPDS: 6, new OB visit   GTT: 114    PROBLEM LIST/PLAN:   GHTN- see separate  Depression/Anxiety: Zoloft 100mg daily - continue.  Stable  In therapy Padmini Maldonado to manage meds. Cont 100mg zoloft.     Orders:  -     POC Urinalysis Dipstick    2. Gestational hypertension, third trimester  Overview:  US growth- done  NSTs weekly- q Sat  Del at 37th week   SAT 27Jul 37+2 AP IOL  Check BP at home, strict HTN/pre-e precautions    7/9/24 nl labs, UPC 0.11        Counseling:    OB precautions, leaking, VB, jerrell cox vs PTL/Labor  FKC  IOL reviewed in detail.  R/B/A/SE/E.  All history reviewed and updated.  Pre-IOL exam performed.  Length can be 24-48+hrs.  PLAN: possibly cytotec vs pitocin and cvx cath.  All questions answered.  She desires to proceed as planned.  She understands during early am the OB  Hospitalist physicians will manage her labor and deliver prn any emergencies.      Reassuring pregnancy progress. Questions answered.  Continue PNV.  Importance of healthy eating, obtaining sufficient sleep, and staying active unless hypertensive- activity modified as directed.    Return for postpartum BP check 1week after IOL.            Melva Ryan DO  07/25/2024    [facility]  [facility] [place] [address]  Dept: [phone]  Dept Fax: [phone]  Loc: [phone]  Loc Fax: [phone]

## 2024-07-25 ENCOUNTER — ROUTINE PRENATAL (OUTPATIENT)
Dept: OBSTETRICS AND GYNECOLOGY | Facility: CLINIC | Age: 29
End: 2024-07-25
Payer: COMMERCIAL

## 2024-07-25 VITALS — BODY MASS INDEX: 36.48 KG/M2 | SYSTOLIC BLOOD PRESSURE: 134 MMHG | WEIGHT: 226 LBS | DIASTOLIC BLOOD PRESSURE: 87 MMHG

## 2024-07-25 DIAGNOSIS — Z34.80 SUPERVISION OF OTHER NORMAL PREGNANCY, ANTEPARTUM: Primary | ICD-10-CM

## 2024-07-25 DIAGNOSIS — O13.3 GESTATIONAL HYPERTENSION, THIRD TRIMESTER: ICD-10-CM

## 2024-07-25 LAB
GLUCOSE UR STRIP-MCNC: NEGATIVE MG/DL
PROT UR STRIP-MCNC: NEGATIVE MG/DL

## 2024-07-26 ENCOUNTER — PREP FOR SURGERY (OUTPATIENT)
Dept: OTHER | Facility: HOSPITAL | Age: 29
End: 2024-07-26
Payer: COMMERCIAL

## 2024-07-26 DIAGNOSIS — O13.3 GESTATIONAL HYPERTENSION, THIRD TRIMESTER: Primary | ICD-10-CM

## 2024-07-26 RX ORDER — SODIUM CHLORIDE 0.9 % (FLUSH) 0.9 %
10 SYRINGE (ML) INJECTION EVERY 12 HOURS SCHEDULED
Status: CANCELLED | OUTPATIENT
Start: 2024-07-26

## 2024-07-26 RX ORDER — OXYTOCIN/0.9 % SODIUM CHLORIDE 30/500 ML
1-16 PLASTIC BAG, INJECTION (ML) INTRAVENOUS CONTINUOUS PRN
Status: CANCELLED | OUTPATIENT
Start: 2024-07-26

## 2024-07-26 RX ORDER — MISOPROSTOL 100 MCG
25 TABLET ORAL ONCE
Status: CANCELLED | OUTPATIENT
Start: 2024-07-26 | End: 2024-07-26

## 2024-07-26 RX ORDER — SODIUM CHLORIDE 9 MG/ML
40 INJECTION, SOLUTION INTRAVENOUS AS NEEDED
Status: CANCELLED | OUTPATIENT
Start: 2024-07-26

## 2024-07-26 RX ORDER — FAMOTIDINE 20 MG/1
20 TABLET, FILM COATED ORAL 2 TIMES DAILY PRN
Status: CANCELLED | OUTPATIENT
Start: 2024-07-26

## 2024-07-26 RX ORDER — FAMOTIDINE 10 MG/ML
20 INJECTION, SOLUTION INTRAVENOUS 2 TIMES DAILY PRN
Status: CANCELLED | OUTPATIENT
Start: 2024-07-26

## 2024-07-26 RX ORDER — CARBOPROST TROMETHAMINE 250 UG/ML
250 INJECTION, SOLUTION INTRAMUSCULAR ONCE AS NEEDED
Status: CANCELLED | OUTPATIENT
Start: 2024-07-26

## 2024-07-26 RX ORDER — SODIUM CHLORIDE 0.9 % (FLUSH) 0.9 %
10 SYRINGE (ML) INJECTION AS NEEDED
Status: CANCELLED | OUTPATIENT
Start: 2024-07-26

## 2024-07-26 RX ORDER — OXYTOCIN/0.9 % SODIUM CHLORIDE 30/500 ML
250 PLASTIC BAG, INJECTION (ML) INTRAVENOUS CONTINUOUS
Status: CANCELLED | OUTPATIENT
Start: 2024-07-26 | End: 2024-07-26

## 2024-07-26 RX ORDER — MAGNESIUM CARB/ALUMINUM HYDROX 105-160MG
30 TABLET,CHEWABLE ORAL ONCE
Status: CANCELLED | OUTPATIENT
Start: 2024-07-26 | End: 2024-07-26

## 2024-07-26 RX ORDER — MISOPROSTOL 200 UG/1
800 TABLET ORAL AS NEEDED
Status: CANCELLED | OUTPATIENT
Start: 2024-07-26

## 2024-07-26 RX ORDER — PROMETHAZINE HYDROCHLORIDE 12.5 MG/1
12.5 TABLET ORAL EVERY 6 HOURS PRN
Status: CANCELLED | OUTPATIENT
Start: 2024-07-26

## 2024-07-26 RX ORDER — ACETAMINOPHEN 325 MG/1
650 TABLET ORAL ONCE AS NEEDED
Status: CANCELLED | OUTPATIENT
Start: 2024-07-26

## 2024-07-26 RX ORDER — FAMOTIDINE 20 MG/1
20 TABLET, FILM COATED ORAL ONCE AS NEEDED
Status: CANCELLED | OUTPATIENT
Start: 2024-07-26

## 2024-07-26 RX ORDER — HYDROCODONE BITARTRATE AND ACETAMINOPHEN 10; 325 MG/1; MG/1
1 TABLET ORAL EVERY 4 HOURS PRN
Status: CANCELLED | OUTPATIENT
Start: 2024-07-26 | End: 2024-08-02

## 2024-07-26 RX ORDER — ONDANSETRON 4 MG/1
4 TABLET, ORALLY DISINTEGRATING ORAL EVERY 6 HOURS PRN
Status: CANCELLED | OUTPATIENT
Start: 2024-07-26

## 2024-07-26 RX ORDER — HYDROCODONE BITARTRATE AND ACETAMINOPHEN 5; 325 MG/1; MG/1
1 TABLET ORAL EVERY 4 HOURS PRN
Status: CANCELLED | OUTPATIENT
Start: 2024-07-26 | End: 2024-08-02

## 2024-07-26 RX ORDER — OXYTOCIN/0.9 % SODIUM CHLORIDE 30/500 ML
999 PLASTIC BAG, INJECTION (ML) INTRAVENOUS ONCE
Status: CANCELLED | OUTPATIENT
Start: 2024-07-26

## 2024-07-26 RX ORDER — CITRIC ACID/SODIUM CITRATE 334-500MG
30 SOLUTION, ORAL ORAL ONCE AS NEEDED
Status: CANCELLED | OUTPATIENT
Start: 2024-07-26

## 2024-07-26 RX ORDER — MISOPROSTOL 100 MCG
50 TABLET ORAL EVERY 4 HOURS
Status: CANCELLED | OUTPATIENT
Start: 2024-07-26

## 2024-07-26 RX ORDER — METOCLOPRAMIDE HYDROCHLORIDE 5 MG/ML
10 INJECTION INTRAMUSCULAR; INTRAVENOUS ONCE AS NEEDED
Status: CANCELLED | OUTPATIENT
Start: 2024-07-26

## 2024-07-26 RX ORDER — ONDANSETRON 2 MG/ML
4 INJECTION INTRAMUSCULAR; INTRAVENOUS EVERY 6 HOURS PRN
Status: CANCELLED | OUTPATIENT
Start: 2024-07-26

## 2024-07-26 RX ORDER — ACETAMINOPHEN 325 MG/1
650 TABLET ORAL EVERY 4 HOURS PRN
Status: CANCELLED | OUTPATIENT
Start: 2024-07-26

## 2024-07-26 RX ORDER — IBUPROFEN 800 MG/1
800 TABLET ORAL ONCE AS NEEDED
Status: CANCELLED | OUTPATIENT
Start: 2024-07-26

## 2024-07-26 RX ORDER — SODIUM CHLORIDE, SODIUM LACTATE, POTASSIUM CHLORIDE, CALCIUM CHLORIDE 600; 310; 30; 20 MG/100ML; MG/100ML; MG/100ML; MG/100ML
125 INJECTION, SOLUTION INTRAVENOUS CONTINUOUS
Status: CANCELLED | OUTPATIENT
Start: 2024-07-26

## 2024-07-26 RX ORDER — LIDOCAINE HYDROCHLORIDE 10 MG/ML
0.5 INJECTION, SOLUTION EPIDURAL; INFILTRATION; INTRACAUDAL; PERINEURAL ONCE AS NEEDED
Status: CANCELLED | OUTPATIENT
Start: 2024-07-26

## 2024-07-26 RX ORDER — TERBUTALINE SULFATE 1 MG/ML
0.25 INJECTION, SOLUTION SUBCUTANEOUS AS NEEDED
Status: CANCELLED | OUTPATIENT
Start: 2024-07-26

## 2024-07-26 RX ORDER — FAMOTIDINE 10 MG/ML
20 INJECTION, SOLUTION INTRAVENOUS ONCE AS NEEDED
Status: CANCELLED | OUTPATIENT
Start: 2024-07-26

## 2024-07-26 RX ORDER — PROMETHAZINE HYDROCHLORIDE 25 MG/1
25 TABLET ORAL EVERY 6 HOURS PRN
Status: CANCELLED | OUTPATIENT
Start: 2024-07-26

## 2024-07-26 NOTE — H&P
OB HISTORY AND PHYSICAL      SUBJECTIVE:    29 y.o. female  currently at 37w1d [facility] complicated by:      Patient Active Problem List    Diagnosis     Gestational hypertension, third trimester [O13.3]     Supervision of other normal pregnancy, antepartum [Z34.80]     Restless leg syndrome in pregnancy [O99.350, G25.81]     Pica [F50.89]     Depression [F32.A]        CC/HPI:  Presents with Scheduled IOL, gestational hypertension about severe features .     ROS: No leaking fluid, No vaginal bleeding, No contractions, Is feeling adequate FM, No HA, No scotomata or vision changes, and No RUQ/epigastric pain    Past OB History:   OB History    Para Term  AB Living   3 1 1   1 1   SAB IAB Ectopic Molar Multiple Live Births   1         1      # Outcome Date GA Lbr Joel/2nd Weight Sex Type Anes PTL Lv   3 Current            2 SAB 23 6w0d    SAB      1 Term 21 40w2d  3771 g (8 lb 5 oz) M Vag-Spont  N JEZ        Prenatal Labs:    Prenatal Results       Initial Prenatal Labs       Test Value Reference Range Date Time    Hemoglobin  13.3 g/dL 12.0 - 15.9 24 1402    Hematocrit  39.0 % 34.0 - 46.6 24 1402    Platelets  246 10*3/mm3 140 - 450 24 1402    Rubella IgG  2.84 index Immune >0.99 24 1402    Hepatitis B SAg  Non-Reactive  Non-Reactive 24 1402    Hepatitis C Ab  Non-Reactive  Non-Reactive 24 1402    RPR  Non Reactive  Non Reactive 24 1602    T. Pallidum Ab   Non-Reactive  Non-Reactive 24 1402    ABO  A   24 1402    Rh  Positive   24 1402    Antibody Screen  Negative   24 1402    HIV        Urine Culture  50,000 CFU/mL Normal Urogenital Bonny   24 1340    Gonorrhea  Negative  Negative 24 1326    Chlamydia  Negative  Negative 24 1326    TSH        HgB A1c   4.80 % 4.80 - 5.60 24 1402    Varicella IgG        Hemoglobinopathy Fractionation  Comment   24 1402    Hemoglobinopathy (genetic testing)         Cystic fibrosis                   Fetal testing        Test Value Reference Range Date Time    NIPT        MSAFP        AFP-4                  2nd and 3rd Trimester       Test Value Reference Range Date Time    Hemoglobin (repeated)  11.8 g/dL 12.0 - 15.9 07/09/24 1430       11.5 g/dL 12.0 - 15.9 06/11/24 1338       11.5 g/dL 12.0 - 15.9 04/29/24 1135    Hematocrit (repeated)  36.0 % 34.0 - 46.6 07/09/24 1430       34.5 % 34.0 - 46.6 06/11/24 1338       34.4 % 34.0 - 46.6 04/29/24 1135    Platelets   157 10*3/mm3 140 - 450 07/09/24 1430       199 10*3/mm3 140 - 450 06/11/24 1338       183 10*3/mm3 140 - 450 04/29/24 1135       246 10*3/mm3 140 - 450 01/31/24 1402    1 hour GTT   114 mg/dL 65 - 139 04/29/24 1135    Antibody Screen (repeated)        3rd TM syphilis scrn (repeated)  RPR   Non Reactive  Non Reactive 05/28/24 1602    3rd TM syphilis scrn (repeated) TP-Ab        3rd TM syphilis screen TB-Ab (FTA)        Syphilis cascade test TP-Ab (EIA)        Syphilis cascade TPPA        GTT Fasting        GTT 1 Hr        GTT 2 Hr        GTT 3 Hr        Group B Strep  Negative  Negative 07/16/24 1155              Other testing        Test Value Reference Range Date Time    Parvo IgG         CMV IgG                   Drug Screening       Test Value Reference Range Date Time    Amphetamine Screen        Barbiturate Screen  Negative  Negative 01/31/24 1340    Benzodiazepine Screen  Negative  Negative 01/31/24 1340    Methadone Screen  Negative  Negative 01/31/24 1340    Phencyclidine Screen        Opiates Screen  Negative  Negative 01/31/24 1340    THC Screen  Negative  Negative 01/31/24 1340    Cocaine Screen  Negative  Negative 01/31/24 1340    Propoxyphene Screen        Buprenorphine Screen        Methamphetamine Screen        Oxycodone Screen  Negative  Negative 01/31/24 1340    Tricyclic Antidepressants Screen                         PMHx:    Past Medical History:   Diagnosis Date    Anxiety     Depression  01/31/2024       Medications:  famotidine, ferrous sulfate, prenatal vitamin, and sertraline    Allergies:  Allergies   Allergen Reactions    Amoxicillin Rash       PSHx:    Past Surgical History:   Procedure Laterality Date    WISDOM TOOTH EXTRACTION         Social History:    reports that she has never smoked. She has never been exposed to tobacco smoke. She has never used smokeless tobacco. She reports that she does not currently use alcohol. She reports that she does not use drugs.    Family History: Non contributory    Immunizations: See prenatal record for Tdap, Flu, Covid and/or other vaccinations    PHYSICAL EXAM:  BP: (134)/(87) 134/87  General- NAD, alert and oriented, appropriate  Psych- normal mood, good memory  Cardiovascular- Regular rhythm, no murnurs  Respiratory- CTA to bases, no wheezes  Abdomen- Gravid, non tender  Fundus-  Non tender.  Size: larger than dates  EFW- 7 1/2 lbs, ultrasound 7/25/2024: 7 pounds 9 ounces 84%, AC 95%   Pelvis-  Adequate   Cervix- 2cm / 50% / -3  Presentation- VTX  Extremities/DTRs- No edema, bilaterally equal, no signs of DVT    Fetal HR: Doptones 110-160, see last OV note  Contractions: Not complaining of any regular contractions    Lab/Imaging/Other:       Lab Results   Component Value Date    CREATININEUR 106.9 07/09/2024    PROTEINUR 11.5 07/09/2024    UTPCR 0.11 07/09/2024        ASSESSMENT:  37w1d  Scheduled IOL  GHTN without severe features  Depression and anxiety-controlled with Zoloft 100 mg daily, in therapy    Lab Results   Component Value Date    STREPGPB Negative 07/16/2024       PLAN:  Admit  Delivery: IOL, cytotec and cervical catheter, then pitocin, OBGYN Hospitalist to admit at pts arrival and manage/deliver prn any emergencies until 07:00.  Pt instructed to arrive at 4:00    Plan of care NLT hospital course, R/B/A/potential SE, suspected length <24hrs have been reviewed with patient and any family or friends present, questions answered to her/his/their  satisfaction.  Pt desires to proceed as above.    Counseling:Specifically with Cytotec, she understands that it is endorsed by the American College of OB/GYN, but not FDA approved for the induction of labor.  The patient was counseled on the risks, benefits and alternatives of Induction.  Risks reviewed, but are not limited to: bleeding, transfusion, fetal intolerance,  (possibly emergent),  and uterine rupture.  She declines expectant management or  and desires induction.  All her questions have been answered to her satisfaction and she desires to proceed.          Electronically signed by Melva Ryan DO, 24, 7:40 AM EDT.    [facility]  [address]  [place] [address]  Dept: [phone]  Loc: [phone]

## 2024-07-26 NOTE — H&P (VIEW-ONLY)
OB HISTORY AND PHYSICAL      SUBJECTIVE:    29 y.o. female  currently at 37w1d [facility] complicated by:      Patient Active Problem List    Diagnosis     Gestational hypertension, third trimester [O13.3]     Supervision of other normal pregnancy, antepartum [Z34.80]     Restless leg syndrome in pregnancy [O99.350, G25.81]     Pica [F50.89]     Depression [F32.A]        CC/HPI:  Presents with Scheduled IOL, gestational hypertension about severe features .     ROS: No leaking fluid, No vaginal bleeding, No contractions, Is feeling adequate FM, No HA, No scotomata or vision changes, and No RUQ/epigastric pain    Past OB History:   OB History    Para Term  AB Living   3 1 1   1 1   SAB IAB Ectopic Molar Multiple Live Births   1         1      # Outcome Date GA Lbr Joel/2nd Weight Sex Type Anes PTL Lv   3 Current            2 SAB 23 6w0d    SAB      1 Term 21 40w2d  3771 g (8 lb 5 oz) M Vag-Spont  N JEZ        Prenatal Labs:    Prenatal Results       Initial Prenatal Labs       Test Value Reference Range Date Time    Hemoglobin  13.3 g/dL 12.0 - 15.9 24 1402    Hematocrit  39.0 % 34.0 - 46.6 24 1402    Platelets  246 10*3/mm3 140 - 450 24 1402    Rubella IgG  2.84 index Immune >0.99 24 1402    Hepatitis B SAg  Non-Reactive  Non-Reactive 24 1402    Hepatitis C Ab  Non-Reactive  Non-Reactive 24 1402    RPR  Non Reactive  Non Reactive 24 1602    T. Pallidum Ab   Non-Reactive  Non-Reactive 24 1402    ABO  A   24 1402    Rh  Positive   24 1402    Antibody Screen  Negative   24 1402    HIV        Urine Culture  50,000 CFU/mL Normal Urogenital Bonny   24 1340    Gonorrhea  Negative  Negative 24 1326    Chlamydia  Negative  Negative 24 1326    TSH        HgB A1c   4.80 % 4.80 - 5.60 24 1402    Varicella IgG        Hemoglobinopathy Fractionation  Comment   24 1402    Hemoglobinopathy (genetic testing)         Cystic fibrosis                   Fetal testing        Test Value Reference Range Date Time    NIPT        MSAFP        AFP-4                  2nd and 3rd Trimester       Test Value Reference Range Date Time    Hemoglobin (repeated)  11.8 g/dL 12.0 - 15.9 07/09/24 1430       11.5 g/dL 12.0 - 15.9 06/11/24 1338       11.5 g/dL 12.0 - 15.9 04/29/24 1135    Hematocrit (repeated)  36.0 % 34.0 - 46.6 07/09/24 1430       34.5 % 34.0 - 46.6 06/11/24 1338       34.4 % 34.0 - 46.6 04/29/24 1135    Platelets   157 10*3/mm3 140 - 450 07/09/24 1430       199 10*3/mm3 140 - 450 06/11/24 1338       183 10*3/mm3 140 - 450 04/29/24 1135       246 10*3/mm3 140 - 450 01/31/24 1402    1 hour GTT   114 mg/dL 65 - 139 04/29/24 1135    Antibody Screen (repeated)        3rd TM syphilis scrn (repeated)  RPR   Non Reactive  Non Reactive 05/28/24 1602    3rd TM syphilis scrn (repeated) TP-Ab        3rd TM syphilis screen TB-Ab (FTA)        Syphilis cascade test TP-Ab (EIA)        Syphilis cascade TPPA        GTT Fasting        GTT 1 Hr        GTT 2 Hr        GTT 3 Hr        Group B Strep  Negative  Negative 07/16/24 1155              Other testing        Test Value Reference Range Date Time    Parvo IgG         CMV IgG                   Drug Screening       Test Value Reference Range Date Time    Amphetamine Screen        Barbiturate Screen  Negative  Negative 01/31/24 1340    Benzodiazepine Screen  Negative  Negative 01/31/24 1340    Methadone Screen  Negative  Negative 01/31/24 1340    Phencyclidine Screen        Opiates Screen  Negative  Negative 01/31/24 1340    THC Screen  Negative  Negative 01/31/24 1340    Cocaine Screen  Negative  Negative 01/31/24 1340    Propoxyphene Screen        Buprenorphine Screen        Methamphetamine Screen        Oxycodone Screen  Negative  Negative 01/31/24 1340    Tricyclic Antidepressants Screen                         PMHx:    Past Medical History:   Diagnosis Date    Anxiety     Depression  01/31/2024       Medications:  famotidine, ferrous sulfate, prenatal vitamin, and sertraline    Allergies:  Allergies   Allergen Reactions    Amoxicillin Rash       PSHx:    Past Surgical History:   Procedure Laterality Date    WISDOM TOOTH EXTRACTION         Social History:    reports that she has never smoked. She has never been exposed to tobacco smoke. She has never used smokeless tobacco. She reports that she does not currently use alcohol. She reports that she does not use drugs.    Family History: Non contributory    Immunizations: See prenatal record for Tdap, Flu, Covid and/or other vaccinations    PHYSICAL EXAM:  BP: (134)/(87) 134/87  General- NAD, alert and oriented, appropriate  Psych- normal mood, good memory  Cardiovascular- Regular rhythm, no murnurs  Respiratory- CTA to bases, no wheezes  Abdomen- Gravid, non tender  Fundus-  Non tender.  Size: larger than dates  EFW- 7 1/2 lbs, ultrasound 7/25/2024: 7 pounds 9 ounces 84%, AC 95%   Pelvis-  Adequate   Cervix- 2cm / 50% / -3  Presentation- VTX  Extremities/DTRs- No edema, bilaterally equal, no signs of DVT    Fetal HR: Doptones 110-160, see last OV note  Contractions: Not complaining of any regular contractions    Lab/Imaging/Other:       Lab Results   Component Value Date    CREATININEUR 106.9 07/09/2024    PROTEINUR 11.5 07/09/2024    UTPCR 0.11 07/09/2024        ASSESSMENT:  37w1d  Scheduled IOL  GHTN without severe features  Depression and anxiety-controlled with Zoloft 100 mg daily, in therapy    Lab Results   Component Value Date    STREPGPB Negative 07/16/2024       PLAN:  Admit  Delivery: IOL, cytotec and cervical catheter, then pitocin, OBGYN Hospitalist to admit at pts arrival and manage/deliver prn any emergencies until 07:00.  Pt instructed to arrive at 4:00    Plan of care NLT hospital course, R/B/A/potential SE, suspected length <24hrs have been reviewed with patient and any family or friends present, questions answered to her/his/their  satisfaction.  Pt desires to proceed as above.    Counseling:Specifically with Cytotec, she understands that it is endorsed by the American College of OB/GYN, but not FDA approved for the induction of labor.  The patient was counseled on the risks, benefits and alternatives of Induction.  Risks reviewed, but are not limited to: bleeding, transfusion, fetal intolerance,  (possibly emergent),  and uterine rupture.  She declines expectant management or  and desires induction.  All her questions have been answered to her satisfaction and she desires to proceed.          Electronically signed by Melva Ryan DO, 24, 7:40 AM EDT.    [facility]  [address]  [place] [address]  Dept: [phone]  Loc: [phone]

## 2024-07-27 ENCOUNTER — ANESTHESIA (OUTPATIENT)
Dept: LABOR AND DELIVERY | Facility: HOSPITAL | Age: 29
End: 2024-07-27
Payer: COMMERCIAL

## 2024-07-27 ENCOUNTER — HOSPITAL ENCOUNTER (INPATIENT)
Facility: HOSPITAL | Age: 29
LOS: 2 days | Discharge: HOME | End: 2024-07-29
Attending: OBSTETRICS & GYNECOLOGY | Admitting: OBSTETRICS & GYNECOLOGY
Payer: COMMERCIAL

## 2024-07-27 ENCOUNTER — ANESTHESIA EVENT (OUTPATIENT)
Dept: LABOR AND DELIVERY | Facility: HOSPITAL | Age: 29
End: 2024-07-27
Payer: COMMERCIAL

## 2024-07-27 ENCOUNTER — HOSPITAL ENCOUNTER (OUTPATIENT)
Dept: LABOR AND DELIVERY | Facility: HOSPITAL | Age: 29
Discharge: HOME | End: 2024-07-27
Payer: COMMERCIAL

## 2024-07-27 DIAGNOSIS — O13.3 GESTATIONAL HYPERTENSION, THIRD TRIMESTER: ICD-10-CM

## 2024-07-27 PROBLEM — Z34.90 ENCOUNTER FOR INDUCTION OF LABOR: Status: ACTIVE | Noted: 2024-07-27

## 2024-07-27 PROBLEM — F50.89 PICA: Status: RESOLVED | Noted: 2024-06-25 | Resolved: 2024-07-27

## 2024-07-27 PROBLEM — N81.2 CERVICAL PROLAPSE: Status: ACTIVE | Noted: 2024-07-27

## 2024-07-27 LAB
ABO GROUP BLD: NORMAL
ALBUMIN SERPL-MCNC: 3.4 G/DL (ref 3.5–5.2)
ALBUMIN/GLOB SERPL: 1.3 G/DL
ALP SERPL-CCNC: 113 U/L (ref 39–117)
ALT SERPL W P-5'-P-CCNC: 12 U/L (ref 1–33)
AMPHET+METHAMPHET UR QL: NEGATIVE
ANION GAP SERPL CALCULATED.3IONS-SCNC: 13.2 MMOL/L (ref 5–15)
AST SERPL-CCNC: 15 U/L (ref 1–32)
ATMOSPHERIC PRESS: 744.4 MMHG
ATMOSPHERIC PRESS: 744.7 MMHG
BARBITURATES UR QL SCN: NEGATIVE
BASE EXCESS BLDCOA CALC-SCNC: -4.4 MMOL/L (ref -2–2)
BASE EXCESS BLDCOV CALC-SCNC: -3.5 MMOL/L (ref -30–30)
BENZODIAZ UR QL SCN: NEGATIVE
BILIRUB SERPL-MCNC: 0.2 MG/DL (ref 0–1.2)
BLD GP AB SCN SERPL QL: NEGATIVE
BUN SERPL-MCNC: 6 MG/DL (ref 6–20)
BUN/CREAT SERPL: 12.5 (ref 7–25)
CALCIUM SPEC-SCNC: 8.8 MG/DL (ref 8.6–10.5)
CANNABINOIDS SERPL QL: NEGATIVE
CHLORIDE SERPL-SCNC: 104 MMOL/L (ref 98–107)
CO2 SERPL-SCNC: 19.8 MMOL/L (ref 22–29)
COCAINE UR QL: NEGATIVE
CREAT SERPL-MCNC: 0.48 MG/DL (ref 0.57–1)
DEPRECATED RDW RBC AUTO: 47.8 FL (ref 37–54)
EGFRCR SERPLBLD CKD-EPI 2021: 131.7 ML/MIN/1.73
ERYTHROCYTE [DISTWIDTH] IN BLOOD BY AUTOMATED COUNT: 14.6 % (ref 12.3–15.4)
FENTANYL UR-MCNC: NEGATIVE NG/ML
GLOBULIN UR ELPH-MCNC: 2.7 GM/DL
GLUCOSE SERPL-MCNC: 98 MG/DL (ref 65–99)
HCO3 BLDCOA-SCNC: 23.4 MMOL/L
HCO3 BLDCOV-SCNC: 23.7 MMOL/L
HCT VFR BLD AUTO: 36.1 % (ref 34–46.6)
HGB BLD-MCNC: 12 G/DL (ref 12–15.9)
HIV 1+2 AB+HIV1P24 AG SERPLBLD IA.RAPID: NORMAL
HIV 1+2 AB+HIV1P24 AG SERPLBLD IA.RAPID: NORMAL
MCH RBC QN AUTO: 30.3 PG (ref 26.6–33)
MCHC RBC AUTO-ENTMCNC: 33.2 G/DL (ref 31.5–35.7)
MCV RBC AUTO: 91.2 FL (ref 79–97)
METHADONE UR QL SCN: NEGATIVE
OPIATES UR QL: NEGATIVE
OXYCODONE UR QL SCN: NEGATIVE
PCO2 BLDCOA: 52.3 MMHG (ref 33–49)
PCO2 BLDCOV: 49.7 MM HG (ref 35–51.3)
PH BLDCOA: 7.26 PH UNITS (ref 7.18–7.34)
PH BLDCOV: 7.29 PH UNITS (ref 7.26–7.4)
PLATELET # BLD AUTO: 136 10*3/MM3 (ref 140–450)
PMV BLD AUTO: 12.8 FL (ref 6–12)
PO2 BLDCOA: 23.7 MMHG
PO2 BLDCOV: 24.9 MM HG (ref 19–39)
POTASSIUM SERPL-SCNC: 4 MMOL/L (ref 3.5–5.2)
PROT SERPL-MCNC: 6.1 G/DL (ref 6–8.5)
RBC # BLD AUTO: 3.96 10*6/MM3 (ref 3.77–5.28)
RH BLD: POSITIVE
SAO2 % BLDCOA: 32.9 %
SAO2 % BLDCOV: 37.5 %
SODIUM SERPL-SCNC: 137 MMOL/L (ref 136–145)
T&S EXPIRATION DATE: NORMAL
TREPONEMA PALLIDUM IGG+IGM AB [PRESENCE] IN SERUM OR PLASMA BY IMMUNOASSAY: NORMAL
WBC NRBC COR # BLD AUTO: 5.41 10*3/MM3 (ref 3.4–10.8)

## 2024-07-27 PROCEDURE — 80053 COMPREHEN METABOLIC PANEL: CPT | Performed by: OBSTETRICS & GYNECOLOGY

## 2024-07-27 PROCEDURE — 25010000002 FENTANYL CITRATE (PF) 50 MCG/ML SOLUTION: Performed by: NURSE ANESTHETIST, CERTIFIED REGISTERED

## 2024-07-27 PROCEDURE — 25010000002 BUPIVACAINE (PF) 0.25 % SOLUTION: Performed by: NURSE ANESTHETIST, CERTIFIED REGISTERED

## 2024-07-27 PROCEDURE — 80307 DRUG TEST PRSMV CHEM ANLYZR: CPT | Performed by: OBSTETRICS & GYNECOLOGY

## 2024-07-27 PROCEDURE — 85027 COMPLETE CBC AUTOMATED: CPT | Performed by: OBSTETRICS & GYNECOLOGY

## 2024-07-27 PROCEDURE — 82803 BLOOD GASES ANY COMBINATION: CPT | Performed by: OBSTETRICS & GYNECOLOGY

## 2024-07-27 PROCEDURE — 10907ZC DRAINAGE OF AMNIOTIC FLUID, THERAPEUTIC FROM PRODUCTS OF CONCEPTION, VIA NATURAL OR ARTIFICIAL OPENING: ICD-10-PCS | Performed by: OBSTETRICS & GYNECOLOGY

## 2024-07-27 PROCEDURE — 86850 RBC ANTIBODY SCREEN: CPT | Performed by: OBSTETRICS & GYNECOLOGY

## 2024-07-27 PROCEDURE — C1755 CATHETER, INTRASPINAL: HCPCS | Performed by: NURSE ANESTHETIST, CERTIFIED REGISTERED

## 2024-07-27 PROCEDURE — 86780 TREPONEMA PALLIDUM: CPT | Performed by: OBSTETRICS & GYNECOLOGY

## 2024-07-27 PROCEDURE — 3E0P7VZ INTRODUCTION OF HORMONE INTO FEMALE REPRODUCTIVE, VIA NATURAL OR ARTIFICIAL OPENING: ICD-10-PCS | Performed by: OBSTETRICS & GYNECOLOGY

## 2024-07-27 PROCEDURE — G0475 HIV COMBINATION ASSAY: HCPCS | Performed by: OBSTETRICS & GYNECOLOGY

## 2024-07-27 PROCEDURE — 59410 OBSTETRICAL CARE: CPT | Performed by: OBSTETRICS & GYNECOLOGY

## 2024-07-27 PROCEDURE — 25810000003 LACTATED RINGERS PER 1000 ML: Performed by: OBSTETRICS & GYNECOLOGY

## 2024-07-27 PROCEDURE — 0KQM0ZZ REPAIR PERINEUM MUSCLE, OPEN APPROACH: ICD-10-PCS | Performed by: OBSTETRICS & GYNECOLOGY

## 2024-07-27 PROCEDURE — 86900 BLOOD TYPING SEROLOGIC ABO: CPT | Performed by: OBSTETRICS & GYNECOLOGY

## 2024-07-27 PROCEDURE — 86901 BLOOD TYPING SEROLOGIC RH(D): CPT | Performed by: OBSTETRICS & GYNECOLOGY

## 2024-07-27 RX ORDER — LIDOCAINE HYDROCHLORIDE AND EPINEPHRINE 15; 5 MG/ML; UG/ML
INJECTION, SOLUTION EPIDURAL
Status: COMPLETED | OUTPATIENT
Start: 2024-07-27 | End: 2024-07-27

## 2024-07-27 RX ORDER — CALCIUM CARBONATE 500 MG/1
2 TABLET, CHEWABLE ORAL 3 TIMES DAILY PRN
Status: DISCONTINUED | OUTPATIENT
Start: 2024-07-27 | End: 2024-07-29 | Stop reason: HOSPADM

## 2024-07-27 RX ORDER — ONDANSETRON 2 MG/ML
4 INJECTION INTRAMUSCULAR; INTRAVENOUS EVERY 6 HOURS PRN
Status: DISCONTINUED | OUTPATIENT
Start: 2024-07-27 | End: 2024-07-27

## 2024-07-27 RX ORDER — POLYETHYLENE GLYCOL 3350 17 G/17G
17 POWDER, FOR SOLUTION ORAL DAILY PRN
Status: DISCONTINUED | OUTPATIENT
Start: 2024-07-27 | End: 2024-07-29 | Stop reason: HOSPADM

## 2024-07-27 RX ORDER — EPHEDRINE SULFATE 50 MG/ML
5 INJECTION, SOLUTION INTRAVENOUS
Status: DISCONTINUED | OUTPATIENT
Start: 2024-07-27 | End: 2024-07-27

## 2024-07-27 RX ORDER — SODIUM CHLORIDE 0.9 % (FLUSH) 0.9 %
10 SYRINGE (ML) INJECTION AS NEEDED
Status: DISCONTINUED | OUTPATIENT
Start: 2024-07-27 | End: 2024-07-27

## 2024-07-27 RX ORDER — METOCLOPRAMIDE HYDROCHLORIDE 5 MG/ML
10 INJECTION INTRAMUSCULAR; INTRAVENOUS ONCE AS NEEDED
Status: DISCONTINUED | OUTPATIENT
Start: 2024-07-27 | End: 2024-07-27

## 2024-07-27 RX ORDER — DOCUSATE SODIUM 100 MG/1
100 CAPSULE, LIQUID FILLED ORAL DAILY
Status: DISCONTINUED | OUTPATIENT
Start: 2024-07-27 | End: 2024-07-29 | Stop reason: HOSPADM

## 2024-07-27 RX ORDER — OXYTOCIN/0.9 % SODIUM CHLORIDE 30/500 ML
999 PLASTIC BAG, INJECTION (ML) INTRAVENOUS ONCE
Status: DISCONTINUED | OUTPATIENT
Start: 2024-07-27 | End: 2024-07-27 | Stop reason: HOSPADM

## 2024-07-27 RX ORDER — MAGNESIUM CARB/ALUMINUM HYDROX 105-160MG
30 TABLET,CHEWABLE ORAL ONCE
Status: DISCONTINUED | OUTPATIENT
Start: 2024-07-27 | End: 2024-07-27

## 2024-07-27 RX ORDER — ONDANSETRON 4 MG/1
4 TABLET, ORALLY DISINTEGRATING ORAL EVERY 6 HOURS PRN
Status: DISCONTINUED | OUTPATIENT
Start: 2024-07-27 | End: 2024-07-27

## 2024-07-27 RX ORDER — CITRIC ACID/SODIUM CITRATE 334-500MG
30 SOLUTION, ORAL ORAL ONCE AS NEEDED
Status: DISCONTINUED | OUTPATIENT
Start: 2024-07-27 | End: 2024-07-27

## 2024-07-27 RX ORDER — BUPIVACAINE HYDROCHLORIDE 2.5 MG/ML
INJECTION, SOLUTION EPIDURAL; INFILTRATION; INTRACAUDAL
Status: COMPLETED
Start: 2024-07-27 | End: 2024-07-27

## 2024-07-27 RX ORDER — SODIUM CHLORIDE, SODIUM LACTATE, POTASSIUM CHLORIDE, CALCIUM CHLORIDE 600; 310; 30; 20 MG/100ML; MG/100ML; MG/100ML; MG/100ML
125 INJECTION, SOLUTION INTRAVENOUS CONTINUOUS
Status: DISCONTINUED | OUTPATIENT
Start: 2024-07-27 | End: 2024-07-27

## 2024-07-27 RX ORDER — SODIUM CHLORIDE 0.9 % (FLUSH) 0.9 %
10 SYRINGE (ML) INJECTION EVERY 12 HOURS SCHEDULED
Status: DISCONTINUED | OUTPATIENT
Start: 2024-07-27 | End: 2024-07-27

## 2024-07-27 RX ORDER — OXYTOCIN/0.9 % SODIUM CHLORIDE 30/500 ML
250 PLASTIC BAG, INJECTION (ML) INTRAVENOUS CONTINUOUS
Status: ACTIVE | OUTPATIENT
Start: 2024-07-27 | End: 2024-07-27

## 2024-07-27 RX ORDER — ACETAMINOPHEN 325 MG/1
650 TABLET ORAL EVERY 6 HOURS
Status: DISCONTINUED | OUTPATIENT
Start: 2024-07-27 | End: 2024-07-27

## 2024-07-27 RX ORDER — IBUPROFEN 800 MG/1
800 TABLET ORAL ONCE AS NEEDED
Status: DISCONTINUED | OUTPATIENT
Start: 2024-07-27 | End: 2024-07-27

## 2024-07-27 RX ORDER — ACETAMINOPHEN 325 MG/1
650 TABLET ORAL EVERY 6 HOURS
Status: DISCONTINUED | OUTPATIENT
Start: 2024-07-27 | End: 2024-07-29 | Stop reason: HOSPADM

## 2024-07-27 RX ORDER — MISOPROSTOL 100 MCG
25 TABLET ORAL ONCE
Status: DISCONTINUED | OUTPATIENT
Start: 2024-07-27 | End: 2024-07-27

## 2024-07-27 RX ORDER — FAMOTIDINE 10 MG/ML
20 INJECTION, SOLUTION INTRAVENOUS 2 TIMES DAILY PRN
Status: DISCONTINUED | OUTPATIENT
Start: 2024-07-27 | End: 2024-07-27

## 2024-07-27 RX ORDER — FAMOTIDINE 20 MG/1
20 TABLET, FILM COATED ORAL 2 TIMES DAILY PRN
Status: DISCONTINUED | OUTPATIENT
Start: 2024-07-27 | End: 2024-07-27

## 2024-07-27 RX ORDER — FAMOTIDINE 10 MG/ML
20 INJECTION, SOLUTION INTRAVENOUS ONCE AS NEEDED
Status: DISCONTINUED | OUTPATIENT
Start: 2024-07-27 | End: 2024-07-27

## 2024-07-27 RX ORDER — MORPHINE SULFATE 5 MG/ML
5 INJECTION, SOLUTION INTRAMUSCULAR; INTRAVENOUS
Status: DISCONTINUED | OUTPATIENT
Start: 2024-07-27 | End: 2024-07-27

## 2024-07-27 RX ORDER — SODIUM CHLORIDE 9 MG/ML
40 INJECTION, SOLUTION INTRAVENOUS AS NEEDED
Status: DISCONTINUED | OUTPATIENT
Start: 2024-07-27 | End: 2024-07-27

## 2024-07-27 RX ORDER — PROMETHAZINE HYDROCHLORIDE 25 MG/1
12.5 TABLET ORAL EVERY 4 HOURS PRN
Status: DISCONTINUED | OUTPATIENT
Start: 2024-07-27 | End: 2024-07-29 | Stop reason: HOSPADM

## 2024-07-27 RX ORDER — ACETAMINOPHEN 325 MG/1
650 TABLET ORAL EVERY 4 HOURS PRN
Status: DISCONTINUED | OUTPATIENT
Start: 2024-07-27 | End: 2024-07-27

## 2024-07-27 RX ORDER — DIPHENOXYLATE HYDROCHLORIDE AND ATROPINE SULFATE 2.5; .025 MG/1; MG/1
1 TABLET ORAL ONCE
Status: COMPLETED | OUTPATIENT
Start: 2024-07-27 | End: 2024-07-27

## 2024-07-27 RX ORDER — OXYTOCIN/0.9 % SODIUM CHLORIDE 30/500 ML
125 PLASTIC BAG, INJECTION (ML) INTRAVENOUS CONTINUOUS PRN
Status: DISCONTINUED | OUTPATIENT
Start: 2024-07-27 | End: 2024-07-29 | Stop reason: HOSPADM

## 2024-07-27 RX ORDER — PROMETHAZINE HYDROCHLORIDE 25 MG/1
12.5 TABLET ORAL EVERY 6 HOURS PRN
Status: DISCONTINUED | OUTPATIENT
Start: 2024-07-27 | End: 2024-07-27

## 2024-07-27 RX ORDER — HYDROCODONE BITARTRATE AND ACETAMINOPHEN 10; 325 MG/1; MG/1
1 TABLET ORAL EVERY 4 HOURS PRN
Status: DISCONTINUED | OUTPATIENT
Start: 2024-07-27 | End: 2024-07-27

## 2024-07-27 RX ORDER — TERBUTALINE SULFATE 1 MG/ML
0.25 INJECTION, SOLUTION SUBCUTANEOUS AS NEEDED
Status: DISCONTINUED | OUTPATIENT
Start: 2024-07-27 | End: 2024-07-27

## 2024-07-27 RX ORDER — FENTANYL CITRATE 50 UG/ML
INJECTION, SOLUTION INTRAMUSCULAR; INTRAVENOUS
Status: COMPLETED | OUTPATIENT
Start: 2024-07-27 | End: 2024-07-27

## 2024-07-27 RX ORDER — DIPHENOXYLATE HYDROCHLORIDE AND ATROPINE SULFATE 2.5; .025 MG/1; MG/1
1 TABLET ORAL EVERY 6 HOURS PRN
Status: DISCONTINUED | OUTPATIENT
Start: 2024-07-27 | End: 2024-07-29 | Stop reason: HOSPADM

## 2024-07-27 RX ORDER — IBUPROFEN 600 MG/1
600 TABLET ORAL EVERY 6 HOURS
Status: DISCONTINUED | OUTPATIENT
Start: 2024-07-27 | End: 2024-07-29 | Stop reason: HOSPADM

## 2024-07-27 RX ORDER — OXYTOCIN/0.9 % SODIUM CHLORIDE 30/500 ML
1-16 PLASTIC BAG, INJECTION (ML) INTRAVENOUS CONTINUOUS PRN
Status: DISCONTINUED | OUTPATIENT
Start: 2024-07-27 | End: 2024-07-27

## 2024-07-27 RX ORDER — HYDROCODONE BITARTRATE AND ACETAMINOPHEN 5; 325 MG/1; MG/1
1 TABLET ORAL EVERY 4 HOURS PRN
Status: DISCONTINUED | OUTPATIENT
Start: 2024-07-27 | End: 2024-07-27

## 2024-07-27 RX ORDER — SODIUM CHLORIDE 0.9 % (FLUSH) 0.9 %
1-10 SYRINGE (ML) INJECTION AS NEEDED
Status: DISCONTINUED | OUTPATIENT
Start: 2024-07-27 | End: 2024-07-29 | Stop reason: HOSPADM

## 2024-07-27 RX ORDER — FENTANYL CITRATE 50 UG/ML
INJECTION, SOLUTION INTRAMUSCULAR; INTRAVENOUS
Status: COMPLETED
Start: 2024-07-27 | End: 2024-07-27

## 2024-07-27 RX ORDER — PROMETHAZINE HYDROCHLORIDE 25 MG/1
25 TABLET ORAL EVERY 6 HOURS PRN
Status: DISCONTINUED | OUTPATIENT
Start: 2024-07-27 | End: 2024-07-27

## 2024-07-27 RX ORDER — ONDANSETRON 4 MG/1
4 TABLET, ORALLY DISINTEGRATING ORAL EVERY 8 HOURS PRN
Status: DISCONTINUED | OUTPATIENT
Start: 2024-07-27 | End: 2024-07-29 | Stop reason: HOSPADM

## 2024-07-27 RX ORDER — ACETAMINOPHEN 325 MG/1
650 TABLET ORAL ONCE AS NEEDED
Status: DISCONTINUED | OUTPATIENT
Start: 2024-07-27 | End: 2024-07-27

## 2024-07-27 RX ORDER — TRAMADOL HYDROCHLORIDE 50 MG/1
50 TABLET ORAL EVERY 6 HOURS PRN
Status: DISCONTINUED | OUTPATIENT
Start: 2024-07-27 | End: 2024-07-29 | Stop reason: HOSPADM

## 2024-07-27 RX ORDER — CARBOPROST TROMETHAMINE 250 UG/ML
250 INJECTION, SOLUTION INTRAMUSCULAR ONCE AS NEEDED
Status: COMPLETED | OUTPATIENT
Start: 2024-07-27 | End: 2024-07-27

## 2024-07-27 RX ORDER — MISOPROSTOL 100 MCG
50 TABLET ORAL EVERY 4 HOURS
Status: DISCONTINUED | OUTPATIENT
Start: 2024-07-27 | End: 2024-07-27

## 2024-07-27 RX ORDER — BUPIVACAINE HYDROCHLORIDE 2.5 MG/ML
INJECTION, SOLUTION EPIDURAL; INFILTRATION; INTRACAUDAL AS NEEDED
Status: DISCONTINUED | OUTPATIENT
Start: 2024-07-27 | End: 2024-07-27 | Stop reason: SURG

## 2024-07-27 RX ORDER — FAMOTIDINE 20 MG/1
20 TABLET, FILM COATED ORAL ONCE AS NEEDED
Status: DISCONTINUED | OUTPATIENT
Start: 2024-07-27 | End: 2024-07-27

## 2024-07-27 RX ORDER — MISOPROSTOL 200 UG/1
800 TABLET ORAL AS NEEDED
Status: DISCONTINUED | OUTPATIENT
Start: 2024-07-27 | End: 2024-07-27

## 2024-07-27 RX ORDER — LIDOCAINE HYDROCHLORIDE 10 MG/ML
0.5 INJECTION, SOLUTION EPIDURAL; INFILTRATION; INTRACAUDAL; PERINEURAL ONCE AS NEEDED
Status: DISCONTINUED | OUTPATIENT
Start: 2024-07-27 | End: 2024-07-27

## 2024-07-27 RX ORDER — IBUPROFEN 600 MG/1
600 TABLET ORAL EVERY 6 HOURS SCHEDULED
Status: DISCONTINUED | OUTPATIENT
Start: 2024-07-27 | End: 2024-07-27

## 2024-07-27 RX ADMIN — LIDOCAINE HYDROCHLORIDE AND EPINEPHRINE 2 ML: 15; 5 INJECTION, SOLUTION EPIDURAL at 08:43

## 2024-07-27 RX ADMIN — Medication 10 ML/HR: at 08:43

## 2024-07-27 RX ADMIN — Medication 1 MILLI-UNITS/MIN: at 05:03

## 2024-07-27 RX ADMIN — FENTANYL CITRATE 100 MCG: 50 INJECTION, SOLUTION INTRAMUSCULAR; INTRAVENOUS at 09:19

## 2024-07-27 RX ADMIN — DIPHENOXYLATE HYDROCHLORIDE AND ATROPINE SULFATE 1 TABLET: 2.5; .025 TABLET ORAL at 20:59

## 2024-07-27 RX ADMIN — FENTANYL CITRATE 100 MCG: 50 INJECTION, SOLUTION INTRAMUSCULAR; INTRAVENOUS at 08:43

## 2024-07-27 RX ADMIN — LIDOCAINE HYDROCHLORIDE AND EPINEPHRINE 3 ML: 15; 5 INJECTION, SOLUTION EPIDURAL at 08:39

## 2024-07-27 RX ADMIN — SODIUM CHLORIDE, POTASSIUM CHLORIDE, SODIUM LACTATE AND CALCIUM CHLORIDE 125 ML/HR: 600; 310; 30; 20 INJECTION, SOLUTION INTRAVENOUS at 04:30

## 2024-07-27 RX ADMIN — CARBOPROST TROMETHAMINE 250 MCG: 250 INJECTION, SOLUTION INTRAMUSCULAR at 15:57

## 2024-07-27 RX ADMIN — SERTRALINE HYDROCHLORIDE 100 MG: 50 TABLET ORAL at 20:14

## 2024-07-27 RX ADMIN — WITCH HAZEL: 500 SOLUTION RECTAL; TOPICAL at 19:09

## 2024-07-27 RX ADMIN — BUPIVACAINE HYDROCHLORIDE 3 ML: 2.5 INJECTION, SOLUTION EPIDURAL; INFILTRATION; INTRACAUDAL; PERINEURAL at 08:43

## 2024-07-27 RX ADMIN — BENZOCAINE AND LEVOMENTHOL: 200; 5 SPRAY TOPICAL at 19:08

## 2024-07-27 RX ADMIN — MISOPROSTOL 800 MCG: 200 TABLET ORAL at 15:52

## 2024-07-27 RX ADMIN — IBUPROFEN 600 MG: 600 TABLET, FILM COATED ORAL at 20:14

## 2024-07-27 RX ADMIN — DIPHENOXYLATE HYDROCHLORIDE AND ATROPINE SULFATE 1 TABLET: 2.5; .025 TABLET ORAL at 19:08

## 2024-07-27 RX ADMIN — ACETAMINOPHEN 650 MG: 325 TABLET ORAL at 23:59

## 2024-07-27 RX ADMIN — LIDOCAINE HYDROCHLORIDE AND EPINEPHRINE 3 ML: 15; 5 INJECTION, SOLUTION EPIDURAL at 09:12

## 2024-07-27 RX ADMIN — LIDOCAINE HYDROCHLORIDE AND EPINEPHRINE 2 ML: 15; 5 INJECTION, SOLUTION EPIDURAL at 09:19

## 2024-07-27 NOTE — INTERVAL H&P NOTE
H&P reviewed. The patient was examined and there are no changes to the H&P.  Joss too freq for cytotec and fetus too low for 80/80 cath.  Sitting up for epidural now.

## 2024-07-27 NOTE — PROGRESS NOTES
Cervical cath attempted but baby's head was so applied to the cvx the balloon was inflating on the pts R side and head was not moving;  SVE:  50/3/-2;  cont pit

## 2024-07-27 NOTE — DISCHARGE SUMMARY
OB Discharge Summary        Admit Date:  2024  Date of Delivery: 2024   Discharge Date: 2024    Reason for Admission/Chief Complaint: Scheduled IOL, GHTN    Final Diagnosis:  37+2, GHTN  IOL,   Depression/anxiety-controlled with Zoloft 100 mg, therapy Padmini Maldonado    To do at FU: Pressure follow-up, follow-up moods, routine postpartum    Antepartum:  Prenatal care is complicated by:  See above Final Diagnosis for list    Intrapartum/Delivery:  OB Surgeon:  Dr. Melva Ryan, DO  Anesthesia: Epidural  Delivery Type:   Perineum : 2nd degree laceration  Feeding method: Breast    Infant: female  infant; Magalie    Weight: 3630 g (8 lb)      APGARS: 8  @ 1 minute / 9  @ 5 minutes    Hospital Course/Significant Findings:  Patient arrived at 4 AM on day of delivery, cervix 2 cm dilated and fetus low pelvis.  Joss too frequently for Cytotec.  Pitocin started.  Received epidural, AROM at 3 cm dilated.  Once progressed to active phase, progressed to complete rapidly.  Pushed 3 times.  Uncomplicated .  Mild atony wo hemorrhage, controlled w cytotec and hemabate.  Cervix prolapsed to introitus at delivery, reducible.  Significant diarrhea despite lomotil after hemabate.  Uncomplicated PP.  BP wnl.       Results from last 7 days   Lab Units 24  0426   WBC 10*3/mm3 5.41   HEMOGLOBIN g/dL 12.0   HEMATOCRIT % 36.1   PLATELETS 10*3/mm3 136*       Results from last 7 days   Lab Units 24  0426   GLUCOSE mg/dL 98   CREATININE mg/dL 0.48*   SODIUM mmol/L 137   POTASSIUM mmol/L 4.0   CHLORIDE mmol/L 104   AST (SGOT) U/L 15   ALT (SGPT) U/L 12       Results from last 7 days   Lab Units 24  0426   TREPONEMA PALLIDUM AB TOTAL  Non-Reactive       Lab Results   Component Value Date    CREATININEUR 106.9 2024    PROTEINUR 11.5 2024    UTPCR 0.11 2024        Discharge:         Discharge Medications        New Medications        Instructions Start Date   acetaminophen 325 MG  tablet  Commonly known as: Tylenol   650 mg, Oral, Every 6 Hours PRN      docusate sodium 100 MG capsule  Commonly known as: Colace   100 mg, Oral, 2 Times Daily PRN      ibuprofen 600 MG tablet  Commonly known as: ADVIL,MOTRIN   600 mg, Oral, Every 6 Hours PRN             Continue These Medications        Instructions Start Date   prenatal (CLASSIC) vitamin 28-0.8 MG tablet tablet  Generic drug: prenatal vitamin   Oral, Daily      sertraline 100 MG tablet  Commonly known as: ZOLOFT   100 mg, Oral, Daily             Stop These Medications      famotidine 20 MG tablet  Commonly known as: Pepcid     ferrous sulfate 325 (65 FE) MG tablet                Disposition: Home  Diet: Regular    Activity: Pelvic rest 6 weeks    Condition at discharge: Good    Follow up with: Dr. Melva Ryan DO or provider of her choice    Follow up in: BP check 1week       Complications: None       Signature: Electronically signed by Melva Ryan DO, 07/29/24, 7:27 PM EDT.        [facility]  [address]  [place] [address]  Dept: [phone]  Dept Fax: [phone]  Loc: [phone]

## 2024-07-27 NOTE — ANESTHESIA PROCEDURE NOTES
Labor Epidural      Patient reassessed immediately prior to procedure    Patient location during procedure: OB  Start Time: 7/27/2024 8:30 AM  Stop Time: 7/27/2024 8:45 AM  Performed By  CRNA/CAA: Leonardo Ashley CRNA  Preanesthetic Checklist  Completed: patient identified, IV checked, risks and benefits discussed, surgical consent, monitors and equipment checked, pre-op evaluation and timeout performed  Prep:  Pt Position:sitting  Sterile Tech:cap, gloves, mask and sterile barrier  Prep:povidone-iodine 7.5% surgical scrub  Monitoring:blood pressure monitoring, continuous pulse oximetry and EKG  Epidural Block Procedure:  Approach:midline  Guidance:landmark technique  Location:L3-L4  Needle Type:Tuohy  Needle Gauge:17 G  Loss of Resistance Medium: saline  Loss of Resistance: 5cm  Cath Depth at skin:12 cm  Paresthesia: none  Aspiration:negative  Test Dose:negative  Medication: fentaNYL citrate (PF) (SUBLIMAZE) injection - Epidural   100 mcg - 7/27/2024 8:43:00 AM  lidocaine 1.5%-EPINEPHrine 1:200,000 (XYLOCAINE W/EPI) injection - Epidural   3 mL - 7/27/2024 8:39:00 AM   2 mL - 7/27/2024 8:43:00 AM  Number of Attempts: 1  Post Assessment:  Dressing:occlusive dressing applied and secured with tape  Pt Tolerance:patient tolerated the procedure well with no apparent complications  Complications:no

## 2024-07-27 NOTE — ANESTHESIA PREPROCEDURE EVALUATION
Anesthesia Evaluation     NPO Solid Status: > 8 hours  NPO Liquid Status: < 2 hours           Airway   Mallampati: II  TM distance: >3 FB  Neck ROM: full  No difficulty expected  Dental - normal exam     Pulmonary - negative pulmonary ROS and normal exam   Cardiovascular - normal exam  Exercise tolerance: good (4-7 METS)    (+) hypertension      Neuro/Psych- negative ROS  GI/Hepatic/Renal/Endo      Musculoskeletal (-) negative ROS    Abdominal    Substance History - negative use     OB/GYN    (+) Pregnant        Other                    Anesthesia Plan    ASA 2     epidural       Anesthetic plan, risks, benefits, and alternatives have been provided, discussed and informed consent has been obtained with: patient.  Pre-procedure education provided    CODE STATUS:    Code Status (Patient has no pulse and is not breathing): CPR (Attempt to Resuscitate)  Medical Interventions (Patient has pulse or is breathing): Full

## 2024-07-27 NOTE — PROGRESS NOTES
OB Intrapartum Note    Subjective: No complaints, Pain controlled, Comfortable with epidural    Objective:  Baseline: Normal 110-160 bpm  Variability:   Moderate/Normal (amplitude 6-25 bpm)  Accelerations: Present (32 weeks+) 15 x 15 bpm  Decelerations: None  Contractions:  Regular, q2min, Pitocin at 16milliunits/min    Cervical exam:    Dilation: 3cm    Effacement: 50%    Station: -1    Impression:    Category I  Reassuring fetus, AROM, Clear fluid    Plan:   Continue pitocin  Continue to monitor  Active labor positioning  I have discussed in detail with patient the current plan to include, but NLT, appropriate expectations for labor and delivery, to include possible length of time expected for delivery and hospital stay.  All questions have been answered to pts satisfaction and pt desires to proceed as noted.  Specific labor and delivery desires: FOB cut cord        Electronically signed by Melva Ryan DO, 07/27/24, 11:59 AM EDT.

## 2024-07-27 NOTE — PROGRESS NOTES
The epidural was not working after 15 minutes.  I had a felt an unusual feeling crunch while placing it. That was followed by a loss of resistance and I was able to thread the catheter although it was at a shallow depth. Because of the depth and lack of efficacy, it seemed certain the catheter was not in the correct position. I explained that  all to the patient and she agreed with replacing the epidural.

## 2024-07-27 NOTE — LACTATION NOTE
Assisted with first feeding in labor and delivery, baby with good suckle on gloved finger, baby with tongue restriction noted almost to tip of tongue, pt reports her first child had this also and was clipped while in hospital. Attempted to latch baby to left breast, baby could not maintain latch to bare nipple, nipple shield  used and baby latched on and off for 20 min, then moved to right side in football position and latched to bare nipple briefly, attempted laid back position and baby latched better but could not maintain latch, small nipple shield used and baby able to maintain latch better, fed for additional 20 min. Discussed attempting to feed baby every 3 hrs, allowing unlimited access to breast with unlimited time on breast. Encouraged her to do awake skin to skin as much as possible. If needed discussed pumping with pt also, LC discussed normal  feeding behavior during the first few days of breastfeeding. I went over waking techniques and how to keep baby awake at breast. Encouraged pt to call out as needed for LC/staff assistance.

## 2024-07-27 NOTE — L&D DELIVERY NOTE
VAGINAL DELIVERY NOTE          Date: 7/27/2024   Time:  3:34 PM   GA: 37w2d    Infant: female  infant   3630 g (8 lb)     APGARS: 8  @ 1 minute / 8  @ 5 minutes    Delivery: The patient progressed to complete, complete and pushed for 3 contractions to deliver a viable infant in cephalic presentation weighing the above weight and above Apgars.  There was no nuchal cord.  The mouth and nares were bulb suctioned on the perineum.  The left anterior and right posterior shoulders were easily delivered without traction.  The remainder of the body delivered.  The infant was vigorous.  Delayed cord clamping for 60 seconds.  The cord was then clamped and cut.  Infant was placed on the maternal chest for recovery.  The placenta delivered intact with the assistance of fundal massage and maternal pushing efforts.      On full evaluation of the perineum, vagina, cervix and rectum a repair was placed.  Second-degree midline laceration, 2-0 Vicryl.  It was repaired in the usual fashion in two layers and stitches to support the perineum.  Excellent approximation and hemostasis was assured.  External anal sphincter was intact.  200mcg of Cytotec was given orally and 200mcg given SL to assist with uterine tone.  Then 200 mcg of Cytotec placed rectally and Hemabate given IM.  Tone appropriate and lochia normal.  Cervix prolapsed to introitus, normal, reducible, discussed with patient and nursing.    Counts were correct.      Estimated Blood Loss: 200 mls    Complications: None         Electronically signed by Melva Ryan DO, 07/27/24, 4:28 PM EDT.       [facility] LABOR AND DELIVERY  [address] [place] [address] [place] [address]  Dept: [phone]  Dept Fax: [phone]  Loc: [phone]

## 2024-07-27 NOTE — PLAN OF CARE
Problem: Bleeding (Postpartum Vaginal Delivery)  Goal: Hemostasis  Outcome: Ongoing, Progressing     Problem: Uterine Tachysystole (Labor)  Goal: Normal Uterine Contraction Pattern  Outcome: Met     Problem: Labor Pain (Labor)  Goal: Acceptable Pain Control  Outcome: Met     Problem: Pain (Postpartum Vaginal Delivery)  Goal: Acceptable Pain Control  Outcome: Ongoing, Progressing   Goal Outcome Evaluation:                 Progressing as expected

## 2024-07-27 NOTE — ANESTHESIA PROCEDURE NOTES
Labor Epidural      Patient reassessed immediately prior to procedure    Patient location during procedure: OB  Start Time: 7/27/2024 9:04 AM  Stop Time: 7/27/2024 9:19 AM  Performed By  CRNA/CAA: Leonardo Ashley CRNA  Preanesthetic Checklist  Completed: patient identified, IV checked, risks and benefits discussed, surgical consent, monitors and equipment checked, pre-op evaluation and timeout performed  Prep:  Pt Position:sitting  Sterile Tech:cap, gloves, mask and sterile barrier  Prep:povidone-iodine 7.5% surgical scrub  Monitoring:blood pressure monitoring, continuous pulse oximetry and EKG  Epidural Block Procedure:  Approach:midline  Guidance:landmark technique  Location:L2-L3  Needle Type:Tuohy  Needle Gauge:17 G  Loss of Resistance Medium: saline  Loss of Resistance: 7cm  Cath Depth at skin:12 cm  Paresthesia: none  Aspiration:negative  Test Dose:negative  Medication: fentaNYL citrate (PF) (SUBLIMAZE) injection - Epidural   100 mcg - 7/27/2024 9:19:00 AM  lidocaine 1.5%-EPINEPHrine 1:200,000 (XYLOCAINE W/EPI) injection - Epidural   3 mL - 7/27/2024 9:12:00 AM   2 mL - 7/27/2024 9:19:00 AM  Number of Attempts: 1  Post Assessment:  Dressing:occlusive dressing applied and secured with tape  Pt Tolerance:patient tolerated the procedure well with no apparent complications  Complications:no

## 2024-07-27 NOTE — DISCHARGE INSTRUCTIONS
DR. HENDRIX'S POSTPARTUM DISCHARGE PRECAUTIONS and Answers to FAQs     NO SEX for SIX weeks.     NO TUB BATH or POOL for TWO week(s), shower only.  Sitz baths are fine.     STITCHES (if present):  wash them daily in the shower with soap and water (any type of soap is fine, it does not need to be antibacterial soap).  It is ok to gently put your finger in and around the vaginal area.  Look at your stitches (the ones on the outside) when you get home.  You will then know what is normal and can have a point of reference to compare it to if you start to have concerns.  REDNESS, PUS, increase in PAIN, FEVER or CHILLS are all reasons to be seen our office immediately.  Go to the ER, if it is after hours or a weekend.       VAGINAL BLEEDING:  may continue on and off over the next several weeks after delivery and may increase slightly once you go home.  You should not be bleeding more than 1 large pad soaked every hour or two.  Clots (even the size of a lemon or larger) may be normal as long as the bleeding is not heavy and the clots do not continue.       FEVER or CHILLS or NOT FEELING WELL: call our office.  If the office is closed, you need to be seen in acute care or ER.       CHEST PAIN or SHORTNESS OF BREATH/AIR: you need to GO TO THE NEAREST ER or CALL 911.      SWELLING: can increase over the next 7-10 days and then should slowly improve.  Your legs/ankles should be fairly similar in size.  A red, painful, hot, swollen leg (usually just one side) can be a sign of a blood clot and should be evaluated immediately.  Call our office.  If it is after hours or a weekend, you must be seen IMMEDIATELY IN THE ER.      ELEVATED BLOOD PRESSURE:  you need to contact us if you are having  persistent elevated BP systolic (top number) more than 155 or diastolic (bottom number) more than 95, or a headache (not relieved with rest, hydration or over the counter pain reliever), an increase in your swelling (usually hands and face),  changes in your vision (typically flashing white or black spots) or severe persistent pain in the location of the upper right side of your belly (under your right breast).  Call our office or go to ER if after hours or a weekend.     LACTATION QUESTIONS or CONCERNS?  Call [facility] Lactation Support [phone].     WORK and SCHOOL TIME OFF: depends on your specific delivery type, surrounding circumstances, and your work insurance/school rules.  If you have questions, please call Cindy Lentz at [phone] (ext. [phone]).  Or email Cindy at [email or web address].  They will assist in required paperwork for you and/or family members.      Any further QUESTIONS or CONCERNS, please call [facility]land at [phone].

## 2024-07-28 PROCEDURE — 0503F POSTPARTUM CARE VISIT: CPT | Performed by: OBSTETRICS & GYNECOLOGY

## 2024-07-28 RX ORDER — DOCUSATE SODIUM 100 MG/1
100 CAPSULE, LIQUID FILLED ORAL 2 TIMES DAILY PRN
Qty: 60 CAPSULE | Refills: 1 | Status: SHIPPED | OUTPATIENT
Start: 2024-07-28

## 2024-07-28 RX ORDER — IBUPROFEN 600 MG/1
600 TABLET ORAL EVERY 6 HOURS PRN
Qty: 30 TABLET | Refills: 1 | Status: SHIPPED | OUTPATIENT
Start: 2024-07-28 | End: 2024-08-05

## 2024-07-28 RX ORDER — ACETAMINOPHEN 325 MG/1
650 TABLET ORAL EVERY 6 HOURS PRN
Qty: 30 TABLET | Refills: 1 | Status: SHIPPED | OUTPATIENT
Start: 2024-07-28 | End: 2024-08-05

## 2024-07-28 RX ADMIN — DIPHENOXYLATE HYDROCHLORIDE AND ATROPINE SULFATE 1 TABLET: 2.5; .025 TABLET ORAL at 04:24

## 2024-07-28 RX ADMIN — IBUPROFEN 600 MG: 600 TABLET, FILM COATED ORAL at 20:49

## 2024-07-28 RX ADMIN — IBUPROFEN 600 MG: 600 TABLET, FILM COATED ORAL at 08:31

## 2024-07-28 RX ADMIN — ACETAMINOPHEN 650 MG: 325 TABLET ORAL at 12:07

## 2024-07-28 RX ADMIN — SERTRALINE HYDROCHLORIDE 100 MG: 50 TABLET ORAL at 20:49

## 2024-07-28 RX ADMIN — IBUPROFEN 600 MG: 600 TABLET, FILM COATED ORAL at 01:32

## 2024-07-28 RX ADMIN — ACETAMINOPHEN 650 MG: 325 TABLET ORAL at 05:50

## 2024-07-28 RX ADMIN — ACETAMINOPHEN 650 MG: 325 TABLET ORAL at 17:23

## 2024-07-28 RX ADMIN — IBUPROFEN 600 MG: 600 TABLET, FILM COATED ORAL at 13:55

## 2024-07-28 NOTE — NURSING NOTE
"This RN to patient room to administer PRN lomotil due to patient experiencing diarrhea following hemebate administration at delivery. Upon entering room, patient found to be in bed and tearful. Patient stated \"I am dizzy and light-headed when I get up to go to the bathroom. I fell to my knees walking back to bed after using the bathroom this last time\". RN informed patient to call out for assistance when needing to get up, patient acknowledged. Assessment performed, range of motion exercises performed, all WNL. Vital signs WNL. Patient had no complaints.  "

## 2024-07-28 NOTE — NURSING NOTE
This RN to patient room to perform safety rounding and to assess patient's dizziness and toileting urgency. Patient reports that she believes both are improving. She stated she just got back from the bathroom. RN reminded patient to call out for assistance with ambulating, patient acknowledged.

## 2024-07-28 NOTE — PROGRESS NOTES
PostPartum/PostOp PROGRESS NOTE        Subjective:  Patient has no complaints, denies HTN sxs  Pain not controlled  Urinating spontaneously  Lochia decreasing, no bleeding concerns  Diarrhea has stopped    Objective:     Temp:  [97.7 °F (36.5 °C)-98.2 °F (36.8 °C)] 98.2 °F (36.8 °C)  Heart Rate:  [67-94] 78  Resp:  [18-20] 18  BP: (108-137)/(49-93) 129/87  General- NAD, alert and oriented, appropriate  Psych- Normal mood, good memory  Cardiovascular/Respiratory- Not examined  Abdomen- Fundus firm, non tender and Fundus at U-1  Extremties/DTRs- +1 edema, bilaterally equal, no signs of DVT    Results from last 7 days   Lab Units 07/27/24  0426   WBC 10*3/mm3 5.41   HEMOGLOBIN g/dL 12.0   HEMATOCRIT % 36.1   PLATELETS 10*3/mm3 136*       Results from last 7 days   Lab Units 07/27/24  0426   GLUCOSE mg/dL 98   CREATININE mg/dL 0.48*   SODIUM mmol/L 137   POTASSIUM mmol/L 4.0   CHLORIDE mmol/L 104   AST (SGOT) U/L 15   ALT (SGPT) U/L 12            Results from last 7 days   Lab Units 07/27/24  0426   TREPONEMA PALLIDUM AB TOTAL  Non-Reactive     Assessment:    Post-partum/postop Day:  1  The patient is currently breastfeeding.    GHTN- no meds, controlled/resolved  Plan:   Routine postpartum/postop care  Shower  Sitz baths  Importance of wound care/keep clean and dry  Breast feeding support  DC meds reviewed  Follow up scheduled  PP/PO precautions given  HTN precautions reviewed in detail.  Questions answered.  RTO/ER for HA not relieved w tylenol, vision changes, epig/RUQ pain, or Bps elevated at home.  OB Hospitalist will see pt tomorrow and until discharge.  Plan discharge tomorrow              Electronically signed by Melva Ryan DO, 07/28/24, 7:58 AM EDT.

## 2024-07-28 NOTE — ANESTHESIA POSTPROCEDURE EVALUATION
Patient: Jaimie Dean    Procedure Summary       Date: 07/27/24 Room / Location:     Anesthesia Start: 0830 Anesthesia Stop: 1534    Procedure: LABOR ANALGESIA Diagnosis:     Scheduled Providers:  Provider: Leonardo Ashley CRNA    Anesthesia Type: epidural ASA Status: 2            Anesthesia Type: No value filed.    Vitals  Vitals Value Taken Time   /86 07/28/24 0900   Temp 36.7 °C (98 °F) 07/28/24 0900   Pulse 89 07/28/24 0900   Resp 18 07/28/24 0900   SpO2 99 % 07/27/24 1749   Vitals shown include unfiled device data.        Post Anesthesia Care and Evaluation    Patient location during evaluation: bedside  Patient participation: complete - patient participated  Level of consciousness: awake  Pain score: 0  Pain management: adequate    Airway patency: patent  Anesthetic complications: No anesthetic complications  PONV Status: none  Cardiovascular status: acceptable  Respiratory status: acceptable  Hydration status: acceptable  Post Neuraxial Block status: Motor and sensory function returned to baseline and No signs or symptoms of PDPHNo anesthesia care post op

## 2024-07-28 NOTE — PLAN OF CARE
Problem: Adult Inpatient Plan of Care  Goal: Plan of Care Review  Outcome: Ongoing, Progressing  Goal: Patient-Specific Goal (Individualized)  Outcome: Ongoing, Progressing  Goal: Absence of Hospital-Acquired Illness or Injury  Outcome: Ongoing, Progressing  Intervention: Identify and Manage Fall Risk  Recent Flowsheet Documentation  Taken 7/28/2024 0550 by Daphnie Fraser RN  Safety Promotion/Fall Prevention: safety round/check completed  Taken 7/28/2024 0424 by Daphnie Fraser RN  Safety Promotion/Fall Prevention: safety round/check completed  Taken 7/28/2024 0132 by Daphnie Fraser RN  Safety Promotion/Fall Prevention: safety round/check completed  Taken 7/28/2024 0030 by Daphnie Fraser RN  Safety Promotion/Fall Prevention: safety round/check completed  Taken 7/27/2024 2359 by Daphnie Fraser RN  Safety Promotion/Fall Prevention: safety round/check completed  Taken 7/27/2024 2212 by Daphnie Fraser RN  Safety Promotion/Fall Prevention: safety round/check completed  Taken 7/27/2024 2059 by Daphnie Fraser RN  Safety Promotion/Fall Prevention:   safety round/check completed   toileting scheduled   room organization consistent  Taken 7/27/2024 2014 by Daphnie Fraser RN  Safety Promotion/Fall Prevention: safety round/check completed  Taken 7/27/2024 1925 by Daphnie Fraser RN  Safety Promotion/Fall Prevention: safety round/check completed  Intervention: Prevent Skin Injury  Recent Flowsheet Documentation  Taken 7/27/2024 2014 by aDphnie Fraser RN  Body Position: sitting up in bed  Intervention: Prevent and Manage VTE (Venous Thromboembolism) Risk  Recent Flowsheet Documentation  Taken 7/27/2024 2059 by Daphnie Fraser RN  Range of Motion: active ROM (range of motion) encouraged  Taken 7/27/2024 2014 by Daphnie Fraser RN  Activity Management: up ad susy  Range of Motion: active ROM (range of motion) encouraged  Intervention: Prevent Infection  Recent Flowsheet Documentation  Taken 7/27/2024 2014 by  Daphnie Fraser RN  Infection Prevention:   visitors restricted/screened   single patient room provided   rest/sleep promoted   personal protective equipment utilized   hand hygiene promoted   equipment surfaces disinfected   environmental surveillance performed   cohorting utilized  Goal: Optimal Comfort and Wellbeing  Outcome: Ongoing, Progressing  Intervention: Monitor Pain and Promote Comfort  Recent Flowsheet Documentation  Taken 7/28/2024 0550 by Daphnie Fraser RN  Pain Management Interventions:   see MAR   pain management plan reviewed with patient/caregiver   quiet environment facilitated  Taken 7/28/2024 0132 by Daphnie Fraser RN  Pain Management Interventions:   see MAR   pain management plan reviewed with patient/caregiver   quiet environment facilitated  Taken 7/27/2024 2359 by Daphnie Fraser RN  Pain Management Interventions:   see MAR   pain management plan reviewed with patient/caregiver   quiet environment facilitated  Intervention: Provide Person-Centered Care  Recent Flowsheet Documentation  Taken 7/27/2024 2014 by Daphnie Fraser RN  Trust Relationship/Rapport:   care explained   choices provided   emotional support provided   empathic listening provided   questions answered   questions encouraged   reassurance provided   thoughts/feelings acknowledged  Goal: Readiness for Transition of Care  Outcome: Ongoing, Progressing     Problem: Adjustment to Role Transition (Postpartum Vaginal Delivery)  Goal: Successful Maternal Role Transition  Outcome: Ongoing, Progressing     Problem: Bleeding (Postpartum Vaginal Delivery)  Goal: Hemostasis  Outcome: Ongoing, Progressing     Problem: Infection (Postpartum Vaginal Delivery)  Goal: Absence of Infection Signs/Symptoms  Outcome: Ongoing, Progressing     Problem: Pain (Postpartum Vaginal Delivery)  Goal: Acceptable Pain Control  Outcome: Ongoing, Progressing  Intervention: Prevent or Manage Pain  Recent Flowsheet Documentation  Taken 7/28/2024 0550  by Daphnie Fraser RN  Pain Management Interventions:   see MAR   pain management plan reviewed with patient/caregiver   quiet environment facilitated  Taken 7/28/2024 0132 by Daphnie Fraser RN  Pain Management Interventions:   see MAR   pain management plan reviewed with patient/caregiver   quiet environment facilitated  Taken 7/27/2024 2359 by Daphnie Fraser RN  Pain Management Interventions:   see MAR   pain management plan reviewed with patient/caregiver   quiet environment facilitated     Problem: Urinary Retention (Postpartum Vaginal Delivery)  Goal: Effective Urinary Elimination  Outcome: Ongoing, Progressing     Problem: Fall Injury Risk  Goal: Absence of Fall and Fall-Related Injury  Outcome: Ongoing, Progressing  Intervention: Promote Injury-Free Environment  Recent Flowsheet Documentation  Taken 7/28/2024 0550 by Daphnie Fraser RN  Safety Promotion/Fall Prevention: safety round/check completed  Taken 7/28/2024 0424 by Daphnie Fraser RN  Safety Promotion/Fall Prevention: safety round/check completed  Taken 7/28/2024 0132 by Daphnie Fraser RN  Safety Promotion/Fall Prevention: safety round/check completed  Taken 7/28/2024 0030 by Daphnie Fraser RN  Safety Promotion/Fall Prevention: safety round/check completed  Taken 7/27/2024 2359 by Daphnie Fraser RN  Safety Promotion/Fall Prevention: safety round/check completed  Taken 7/27/2024 2212 by Daphnie Fraser RN  Safety Promotion/Fall Prevention: safety round/check completed  Taken 7/27/2024 2059 by Daphnie Fraser RN  Safety Promotion/Fall Prevention:   safety round/check completed   toileting scheduled   room organization consistent  Taken 7/27/2024 2014 by Daphnie Fraser RN  Safety Promotion/Fall Prevention: safety round/check completed  Taken 7/27/2024 1925 by Daphnie Fraser RN  Safety Promotion/Fall Prevention: safety round/check completed   Goal Outcome Evaluation:

## 2024-07-28 NOTE — PLAN OF CARE
Problem: Adult Inpatient Plan of Care  Goal: Plan of Care Review  7/28/2024 1757 by Virgil Jones RN  Outcome: Ongoing, Progressing  Goal: Patient-Specific Goal (Individualized)  7/28/2024 1757 by Virgil Jones RN  Outcome: Ongoing, Progressing  7/28/2024 1753 by Virgil Jonse RN  Outcome: Ongoing, Progressing  Goal: Absence of Hospital-Acquired Illness or Injury  7/28/2024 1757 by Virgil Jones RN  Outcome: Ongoing, Progressing  7/28/2024 1753 by Virgil Jones RN  Outcome: Ongoing, Progressing  Goal: Optimal Comfort and Wellbeing  7/28/2024 1757 by Virgil Joens RN  Outcome: Ongoing, Progressing  7/28/2024 1753 by Virgil Jones RN  Outcome: Ongoing, Progressing  Goal: Readiness for Transition of Care  7/28/2024 1757 by Virgil Jones RN  Outcome: Ongoing, Progressing  7/28/2024 1753 by Virgil Jones RN  Outcome: Ongoing, Progressing     Problem: Adjustment to Role Transition (Postpartum Vaginal Delivery)  Goal: Successful Maternal Role Transition  7/28/2024 1757 by Virgil Jones RN  Outcome: Ongoing, Progressing  7/28/2024 1753 by Virgil Jones RN  Outcome: Ongoing, Progressing     Problem: Bleeding (Postpartum Vaginal Delivery)  Goal: Hemostasis  7/28/2024 1757 by Virgil Jones RN  Outcome: Ongoing, Progressing  7/28/2024 1753 by Virgil Jones RN  Outcome: Ongoing, Progressing     Problem: Infection (Postpartum Vaginal Delivery)  Goal: Absence of Infection Signs/Symptoms  7/28/2024 1757 by Virgil Jones RN  Outcome: Ongoing, Progressing  7/28/2024 1753 by Virgil Jones RN  Outcome: Ongoing, Progressing     Problem: Pain (Postpartum Vaginal Delivery)  Goal: Acceptable Pain Control  7/28/2024 1757 by Virgil Jones, LAITH  Outcome: Ongoing, Progressing  7/28/2024 1753 by Virgil Jones, LAITH  Outcome:  Ongoing, Progressing     Problem: Urinary Retention (Postpartum Vaginal Delivery)  Goal: Effective Urinary Elimination  7/28/2024 1757 by Virgil Jones RN  Outcome: Ongoing, Progressing  7/28/2024 1753 by Virgil Jones RN  Outcome: Ongoing, Progressing     Problem: Breastfeeding  Goal: Effective Breastfeeding  7/28/2024 1757 by Virgil Jones RN  Outcome: Ongoing, Progressing  7/28/2024 1753 by Virgil Jones RN  Outcome: Ongoing, Progressing     Problem: Fall Injury Risk  Goal: Absence of Fall and Fall-Related Injury  7/28/2024 1757 by Virgil Jones RN  Outcome: Ongoing, Progressing  7/28/2024 1753 by Vrigil Jones RN  Outcome: Ongoing, Progressing   Goal Outcome Evaluation:  Plan of Care Reviewed With: patient        Progress: improving  Outcome Evaluation: Pt up adl ib today without feeling lightheaded, bleeding WNL and pain under control. Pt had a fall yesterday in her room, but states she does not have any pain from that and is feeling better than yesterday. Pt has been able to rest throughout the day and says she is coping well. Pt is pumping regularly and supplementing with formula.

## 2024-07-28 NOTE — PLAN OF CARE
Problem: Adult Inpatient Plan of Care  Goal: Plan of Care Review  Outcome: Ongoing, Progressing  Flowsheets (Taken 7/28/2024 9625)  Progress: improving  Plan of Care Reviewed With: patient  Outcome Evaluation: Pt up adl ib today without feeling lightheaded, bleeding WNL and pain under control. Pt had a fall yesterday in her room, but states she does not have any pain from that and is feeling better than yesterday. Pt has been able to rest throughout the day and says she is coping well. Pt is pumping regularly and supplementing with formula.   Goal Outcome Evaluation:  Plan of Care Reviewed With: patient        Progress: improving  Outcome Evaluation: Pt up adl ib today without feeling lightheaded, bleeding WNL and pain under control. Pt had a fall yesterday in her room, but states she does not have any pain from that and is feeling better than yesterday. Pt has been able to rest throughout the day and says she is coping well. Pt is pumping regularly and supplementing with formula.

## 2024-07-28 NOTE — LACTATION NOTE
PT states baby gets very frustrated at breast, she has decided to formula feed until her milk comes in, she wants to start pumping. Pt set up with hospital breast pump, instructed on use and cleaning of pump and parts, discussed breast milk storage at hospital and at home. Verbalized understanding. Discussed pumping every 3 hours for 15 mins for proper stimulation and adequate milk supply. Schedule placed on white board, pt was able to pump 8cc, demonstrated syringe feeding.  Pt to call LC as needed.

## 2024-07-29 VITALS
WEIGHT: 226 LBS | BODY MASS INDEX: 36.32 KG/M2 | RESPIRATION RATE: 18 BRPM | SYSTOLIC BLOOD PRESSURE: 125 MMHG | HEART RATE: 88 BPM | DIASTOLIC BLOOD PRESSURE: 85 MMHG | TEMPERATURE: 97.5 F | HEIGHT: 66 IN | OXYGEN SATURATION: 100 %

## 2024-07-29 PROCEDURE — 0503F POSTPARTUM CARE VISIT: CPT | Performed by: OBSTETRICS & GYNECOLOGY

## 2024-07-29 RX ADMIN — IBUPROFEN 600 MG: 600 TABLET, FILM COATED ORAL at 02:53

## 2024-07-29 RX ADMIN — IBUPROFEN 600 MG: 600 TABLET, FILM COATED ORAL at 08:18

## 2024-07-29 RX ADMIN — ACETAMINOPHEN 650 MG: 325 TABLET ORAL at 00:15

## 2024-07-29 RX ADMIN — DOCUSATE SODIUM 100 MG: 100 CAPSULE, LIQUID FILLED ORAL at 08:18

## 2024-07-29 RX ADMIN — ACETAMINOPHEN 650 MG: 325 TABLET ORAL at 04:42

## 2024-07-29 RX ADMIN — ACETAMINOPHEN 650 MG: 325 TABLET ORAL at 11:52

## 2024-07-29 NOTE — PLAN OF CARE
Goal Outcome Evaluation:           Progress: improving  Outcome Evaluation: Pt pain controlled with tylenol and motrin. Bottle fed infant all night. Remained fall free this shift. Fundus and lochia WNL.

## 2024-07-29 NOTE — PLAN OF CARE
Goal Outcome Evaluation:           Progress: improving  Outcome Evaluation: Pain controlled with scheduled tylenol and motrin. Fundus firm with scant lochia. Vital signs WNL. Pt discharging home. Discharge instructions reviewed with patient and significant other.

## 2024-07-29 NOTE — PROGRESS NOTES
AMANDA Goodson  Vaginal Delivery Progress Note    Subjective   Postpartum Day 2: Vaginal Delivery    The patient feels well.  Her pain is well controlled with nonsteroidal anti-inflammatory drugs and Tylenol.   She is ambulating well.  Patient describes her bleeding as moderate lochia.    Breastfeeding: without difficulty.    Objective     Vital Signs Range for the last 24 hours  Temperature: Temp:  [97.5 °F (36.4 °C)-98.1 °F (36.7 °C)] 97.5 °F (36.4 °C)   Temp Source: Temp src: Oral   BP: BP: (125-135)/(82-87) 125/85   Pulse: Heart Rate:  [70-88] 88   Respirations: Resp:  [18] 18       Physical Exam:  General:  no acute distress.  Abdomen: abdomen is soft without significant tenderness, masses, organomegaly or guarding.   Fundus: appropriate, firm, non tender, small lochia   Extremities: normal, atraumatic, no cyanosis, and trace edema.     Rubella:   Rubella Antibodies, IgG   Date Value Ref Range Status   2024 2.84 Immune >0.99 index Final     Comment:                                     Non-immune       <0.90                                  Equivocal  0.90 - 0.99                                  Immune           >0.99     Rh Status:    RH type   Date Value Ref Range Status   2024 Positive  Final     Immunizations:   Immunization History   Administered Date(s) Administered    COVID-19 (MODERNA) 1st,2nd,3rd Dose Monovalent 2021, 09/10/2021    Fluzone (or Fluarix & Flulaval for VFC) >6mos 10/03/2023       Assessment & Plan       Gestational hypertension, third trimester    Supervision of other normal pregnancy, antepartum    Depression    Restless leg syndrome in pregnancy    Encounter for induction of labor     (normal spontaneous vaginal delivery)    Cervical prolapse      Jaimie Dean is Day 2  post-partum  Vaginal, Spontaneous   .      Plan:  Continue current care.  Doing well, Bps good;  will d/c home      Electronically signed by Deanna Ferrari MD, 24, 9:40 AM EDT.

## 2024-07-29 NOTE — PROGRESS NOTES
" Post Delivery EARLY Follow up (1-2weeks)        Chief Complaint   Patient presents with    Postpartum Care     Early      Date of delivery: 2024  Delivery type:    Perineum : 2nd degree laceration  Feeding: Bottle, Pumping    HPI:     HA:  no, occas mild goes away spontaneously  Vision changes:  no  RUQ/epigastric pain:  no  Swelling:  no    Pain:  no  Vaginal Bleeding:  yes, very light    Depressed/Anxious:  no history of, on Zoloft 100 mg.  Doing very well  EPDS score: 3   #10: 0    Weight at last OB OV: 226lbs  Discharge Summary by Melva Ryan DO (2024 16:32)      PHYSICAL EXAM:  /93   Pulse 83   Ht 167.6 cm (65.98\")   Wt 92.4 kg (203 lb 12.8 oz)   LMP 2023   Breastfeeding No   BMI 32.91 kg/m²  10.4 kg (23 lb)  General- NAD, alert and oriented, appropriate  Psych- Normal mood, good memory, good eye contact  Extremities- No edema, bilaterally equal, DTR patella +1    ASSESSMENT AND PLAN:  Diagnoses and all orders for this visit:    1. Gestational hypertension (Primary)  Comments:  Persistent PP, mild    2. Anxiety and depression  Comments:  cont zoloft 100mg  Orders:  -     sertraline (ZOLOFT) 100 MG tablet; Take 1 tablet by mouth Daily.  Dispense: 90 tablet; Refill: 4      Counseling:   Return to office for HA unrelieved w rest/hydration/OTC meds, vision changes, increase in edema, RUQ/epigastric pain, any concerns.  Check BPs at home.  Return to office or L+D if after hours for systolic >155 OR diastolic >105.  Postpartum/Postop  precautions reviewed.      Follow Up:  Return for Postpartum FU 3-4 weeks.            Melva Ryan DO  2024    [facility]  [facility] [place] [address]  Dept: [phone]  Dept Fax: [phone]  Loc: [phone]  Loc Fax: [phone]   "

## 2024-07-29 NOTE — LACTATION NOTE
Pt states some soreness to nipples when pumping, is using lanolin. Hydrogel pads given and instructed on use. D/C instructions gone over, included hand hygiene, respiratory hygiene and breastfeeding when mom is sick, LC encouraged pt to see pediatrician within two days of discharge for follow up. LC discussed  breastfeeding behaviors, first two weeks of breastfeeding expectations, encouraged her to breastfeed/pump frequently for good milk supply. LC discussed nipple care, plugged ducts, engorgement, and breast infection. LC informed pt that LC was available after D/C for assistance with breastfeeding.

## 2024-08-03 ENCOUNTER — TELEPHONE (OUTPATIENT)
Dept: LACTATION | Facility: HOSPITAL | Age: 29
End: 2024-08-03
Payer: COMMERCIAL

## 2024-08-03 NOTE — TELEPHONE ENCOUNTER
Pt states she is exclusively pumping for now. Her milk came in on Thursday, she is getting 2-4oz each 3 hours that she pumps. Baby is taking 2-3oz each feeding. Baby is gaining wt.  encouraged pt if she chooses to start latching again to reach out to LC for outpt appointment. Pt verb understanding. Pt has no questions or concerns, encouraged to contact LC as needed.

## 2024-08-05 ENCOUNTER — POSTPARTUM VISIT (OUTPATIENT)
Dept: OBSTETRICS AND GYNECOLOGY | Facility: CLINIC | Age: 29
End: 2024-08-05
Payer: COMMERCIAL

## 2024-08-05 VITALS
DIASTOLIC BLOOD PRESSURE: 93 MMHG | BODY MASS INDEX: 32.75 KG/M2 | SYSTOLIC BLOOD PRESSURE: 135 MMHG | WEIGHT: 203.8 LBS | HEART RATE: 83 BPM | HEIGHT: 66 IN

## 2024-08-05 DIAGNOSIS — O13.9 GESTATIONAL HYPERTENSION, ANTEPARTUM: Primary | ICD-10-CM

## 2024-08-05 DIAGNOSIS — F41.9 ANXIETY AND DEPRESSION: ICD-10-CM

## 2024-08-05 DIAGNOSIS — F32.A ANXIETY AND DEPRESSION: ICD-10-CM

## 2024-08-05 PROCEDURE — 99213 OFFICE O/P EST LOW 20 MIN: CPT | Performed by: OBSTETRICS & GYNECOLOGY

## 2024-08-05 RX ORDER — SERTRALINE HYDROCHLORIDE 100 MG/1
100 TABLET, FILM COATED ORAL DAILY
Qty: 90 TABLET | Refills: 4 | Status: SHIPPED | OUTPATIENT
Start: 2024-08-05

## 2024-08-07 ENCOUNTER — MATERNAL SCREENING (OUTPATIENT)
Dept: CALL CENTER | Facility: HOSPITAL | Age: 29
End: 2024-08-07
Payer: COMMERCIAL

## 2024-08-07 NOTE — OUTREACH NOTE
Maternal Screening Survey      Flowsheet Row Responses   Facility patient discharged from? [facility]   Attempt successful? No   Unsuccessful attempts Attempt 2              Trupti GASTELUM - Registered Nurse

## 2024-08-07 NOTE — OUTREACH NOTE
Maternal Screening Survey      Flowsheet Row Responses   Facility patient discharged from? [facility]   Attempt successful? No   Unsuccessful attempts Attempt 1              Trupti GASTELUM - Registered Nurse

## 2024-08-08 ENCOUNTER — MATERNAL SCREENING (OUTPATIENT)
Dept: CALL CENTER | Facility: HOSPITAL | Age: 29
End: 2024-08-08
Payer: COMMERCIAL

## 2024-08-08 NOTE — OUTREACH NOTE
Maternal Screening Survey      Flowsheet Row Responses   Facility patient discharged from? [facility]   Attempt successful? No   Unsuccessful attempts Attempt 3   Revoke Decline to participate              Trupti GASTELUM - Registered Nurse

## 2024-08-26 ENCOUNTER — TELEPHONE (OUTPATIENT)
Dept: OBSTETRICS AND GYNECOLOGY | Facility: CLINIC | Age: 29
End: 2024-08-26

## 2024-08-28 PROBLEM — Z34.90 ENCOUNTER FOR INDUCTION OF LABOR: Status: RESOLVED | Noted: 2024-07-27 | Resolved: 2024-08-28

## 2024-08-28 PROBLEM — N81.2 CERVICAL PROLAPSE: Status: RESOLVED | Noted: 2024-07-27 | Resolved: 2024-08-28

## 2024-08-28 PROBLEM — O99.350 RESTLESS LEG SYNDROME IN PREGNANCY: Status: RESOLVED | Noted: 2024-06-25 | Resolved: 2024-08-28

## 2024-08-28 PROBLEM — Z34.80 SUPERVISION OF OTHER NORMAL PREGNANCY, ANTEPARTUM: Status: RESOLVED | Noted: 2024-01-30 | Resolved: 2024-08-28

## 2024-08-28 PROBLEM — G25.81 RESTLESS LEG SYNDROME IN PREGNANCY: Status: RESOLVED | Noted: 2024-06-25 | Resolved: 2024-08-28

## 2024-08-28 PROBLEM — F32.A DEPRESSION: Status: RESOLVED | Noted: 2024-01-31 | Resolved: 2024-08-28

## 2024-08-28 PROBLEM — O13.3 GESTATIONAL HYPERTENSION, THIRD TRIMESTER: Status: RESOLVED | Noted: 2024-06-25 | Resolved: 2024-08-28

## 2024-08-28 NOTE — PROGRESS NOTES
"POSTPARTUM Follow Up Visit      Chief Complaint   Patient presents with    Postpartum Care     HPI:    Date of delivery: 2024  Delivery type:            Perineum : 2nd degree laceration  Delivering Provider:   Dr. Melva Ryan        Feeding: Pumping  Pain:  no  Vaginal Bleeding:  yes, light dark red  Plans for BC:  Desires to discuss options,  plans vasectomy, but has no apptment yet    Depressed/Anxious:  no History of anxiety and depression on Zoloft 100 mg and in therapy, wants to continue  EPDS score: 4  #10: 0    Weight at last OB OV:  226lbs  Last pap date and result: IGP,rfx Aptima HPV All Pth (2022 14:40) Pap only negative    Discharge Summary by Melva Ryan DO (2024 16:32)  Progress Notes by Melva Ryan DO (2024 11:00)    History of gestational hypertension-no antihypertensives    PHYSICAL EXAM:  /83   Pulse 72   Ht 167.6 cm (65.98\")   Wt 90.3 kg (199 lb)   Breastfeeding Yes Comment: Pumping  BMI 32.14 kg/m²  Not found.  General- NAD, alert and oriented, appropriate  Psych- Normal mood, good memory, good eye contact      Abdomen- Soft, non distended, non tender, no masses     Chaperone present during pelvic exam.  External genitalia- Normal.    Urethra/Bladder/Vagina- Normal, no masses, non-tender  Prolapse : none noted, not examined with split speculum to delineate   STITCHES : Intact, well approximated, no evidence of infection  Cervix- Normal, no lesions, no discharge, no CMT  Uterus- Normal size, shape & consistency.  Non tender, mobile.  Adnexa- Normal, no mass, non-tender    Lymphatic- No palpable groin nodes  Extremities- No edema    ASSESSMENT AND PLAN:  Diagnoses and all orders for this visit:    1. Postpartum follow-up (Primary)    2. Birth control counseling  Comments:  considering [facility], will call if desires    3. Gestational hypertension, third trimester, history of  Comments:  resolved    4. Anxiety and depression  Comments:  continue zoloft " 100mg      Counseling:    All birth control options reviewed in detail.  R/B/A/SE/E of each wrt pts PMHx and prior BC use  May resume normal activities  Core strengthening exercises reviewed and recommended  Jean-Paul exercises reviewed and recommended  Ok to return to work/school once patient desires/maternity leave completed  Abst[facility] until [facility]        Follow Up:  Return in about 1 year (around 8/29/2025) for WWE sooner for BC once decides.          Melva Ryan, DO  08/29/2024    [facility]  [address] [place] [address]  Dept: [phone]  Dept Fax: [phone]  Loc: [phone]  Loc Fax: [phone]

## 2024-08-29 ENCOUNTER — POSTPARTUM VISIT (OUTPATIENT)
Dept: OBSTETRICS AND GYNECOLOGY | Facility: CLINIC | Age: 29
End: 2024-08-29
Payer: COMMERCIAL

## 2024-08-29 VITALS
SYSTOLIC BLOOD PRESSURE: 118 MMHG | BODY MASS INDEX: 31.98 KG/M2 | DIASTOLIC BLOOD PRESSURE: 83 MMHG | HEIGHT: 66 IN | WEIGHT: 199 LBS | HEART RATE: 72 BPM

## 2024-08-29 DIAGNOSIS — O13.3 GESTATIONAL HYPERTENSION, THIRD TRIMESTER: ICD-10-CM

## 2024-08-29 DIAGNOSIS — F41.9 ANXIETY: ICD-10-CM

## 2024-08-29 DIAGNOSIS — Z30.09 BIRTH CONTROL COUNSELING: ICD-10-CM
